# Patient Record
Sex: FEMALE | Race: WHITE | NOT HISPANIC OR LATINO
[De-identification: names, ages, dates, MRNs, and addresses within clinical notes are randomized per-mention and may not be internally consistent; named-entity substitution may affect disease eponyms.]

---

## 2019-03-18 ENCOUNTER — APPOINTMENT (OUTPATIENT)
Dept: ORTHOPEDIC SURGERY | Facility: CLINIC | Age: 64
End: 2019-03-18
Payer: MEDICARE

## 2019-03-18 VITALS
HEART RATE: 69 BPM | BODY MASS INDEX: 19.74 KG/M2 | OXYGEN SATURATION: 99 % | WEIGHT: 110 LBS | SYSTOLIC BLOOD PRESSURE: 147 MMHG | DIASTOLIC BLOOD PRESSURE: 87 MMHG | HEIGHT: 62.5 IN

## 2019-03-18 DIAGNOSIS — Z78.9 OTHER SPECIFIED HEALTH STATUS: ICD-10-CM

## 2019-03-18 DIAGNOSIS — Z60.2 PROBLEMS RELATED TO LIVING ALONE: ICD-10-CM

## 2019-03-18 PROCEDURE — 73564 X-RAY EXAM KNEE 4 OR MORE: CPT | Mod: LT

## 2019-03-18 PROCEDURE — 99203 OFFICE O/P NEW LOW 30 MIN: CPT

## 2019-03-18 SDOH — SOCIAL STABILITY - SOCIAL INSECURITY: PROBLEMS RELATED TO LIVING ALONE: Z60.2

## 2019-03-18 NOTE — HISTORY OF PRESENT ILLNESS
[de-identified] : Ms. Villegas is a 64-year-old woman who comes in with pain in her left knee. Pain started on March 9, 2019. She started to have pain when she was running and then later went to a wedding and was dancing and there was a crack in the knee and she had severe pain. She had a vasovagal reaction but not complete syncope. She could not bend her knee at all initially after the event. Gradually over the past 9 days her knee has started to bend more and pain is subsiding. Pain is about a 4-6/10 and is dull and occasionally sharp. It's now more intermittent aggravated by bending, walking and twisting. It's better with rest, heat and ice. She hasn't had any treatment for this.\par Normally she runs every day. She usually goes about 6 miles which is about a half walking and have running. In the past she has done marathons. She avoids running on roads because of chronic back pain.\par The pain in her knee with anterior and posterior. No prior knee issues.\par She is allergic to NSAIDs.

## 2019-03-18 NOTE — PHYSICAL EXAM
[LE] : 5/5 motor strength in bilateral lower extremities [DP] : dorsalis pedis 2+ and symmetric bilaterally [Normal RLE] : Right Lower Extremity: No scars, rashes, lesions, ulcers, skin intact [PT] : posterior tibial 2+ and symmetric bilaterally [Normal LLE] : Left Lower Extremity: No scars, rashes, lesions, ulcers, skin intact [Normal Touch] : sensation intact for touch [Normal] : Oriented to person, place, and time, insight and judgement were intact and the affect was normal [de-identified] : Bilateral knees:\par Nonantalgic gait. Intermittent pain when walking that makes her limp occasionally \par No effusion. No palpable palp popliteal cyst\par - erythema, edema, warmth. Skin is intact\par Tender LEFT knee medial joint line much more significant on the LEFT and RIGHT. Mild patellar tenderness LEFT.\par ROM: 0 degrees extension to 135 degrees flexion LEFT knee and 140° on the RIGHT knee with pain on full flexion LEFT knee. No  crepitus\par  LEFT knee + Jeffrey.\par 1A Lachman.  - Pivot shift. - posterior drawer. Normal rotational, varus/valgus laxity.\par Intact extensor mechanism.\par NVI distally.\par  [de-identified] : no respiratory distress [de-identified] : \par x-rays of the LEFT knee weightbearing 4 views today show possibly mild medial joint space narrowing but no osteophytes, sclerosis or subchondral cysts.

## 2019-03-18 NOTE — ASSESSMENT
[FreeTextEntry1] : 64-year-old woman with LEFT knee pain medially that started about 10 days ago. I suspect that she may have a degenerative type medial meniscus tear that did tear somewhat acutely causing her symptoms. She does not have any significant osteoarthritis.\par It is improving. I would recommend trying some physical therapy and we've reviewed home exercises for her to do. Right now she shouldn't do long walking or any running. She can ride a stationary bike. I don't think she needs a cane unless it's painful and then she can use the cane if needed. Unfortunately she cannot take NSAIDs. I offered her a corticosteroid injection as an option but we will wait and see if he gets better without it.\par Followup in 5-6 weeks if her knee is not better. If it's not getting better I will have her get an MRI scan.If her knee locks up or becomes acutely painful again then she may want to come in sooner

## 2019-04-24 ENCOUNTER — APPOINTMENT (OUTPATIENT)
Dept: ORTHOPEDIC SURGERY | Facility: CLINIC | Age: 64
End: 2019-04-24
Payer: MEDICARE

## 2019-04-24 DIAGNOSIS — S83.242A OTHER TEAR OF MEDIAL MENISCUS, CURRENT INJURY, LEFT KNEE, INITIAL ENCOUNTER: ICD-10-CM

## 2019-04-24 PROCEDURE — 99214 OFFICE O/P EST MOD 30 MIN: CPT

## 2019-04-24 NOTE — HISTORY OF PRESENT ILLNESS
[de-identified] : 63 yo with ongoing left knee pain.Her knee is feeling somewhat better. She uses a cane temporarily but then stopped using it because she didn't really need it.\par She didn't go to PT but did home exercises \par SHe took a little Tylenol but no NSAIDs  b/c she is allergic.\par She used the [place] for awhile.\par He does feel intermittent pain, stiffness in the knee is still not good enough that she would go running.

## 2019-04-24 NOTE — ASSESSMENT
[FreeTextEntry1] : 65 yo With improved but ongoing LEFT knee pain. I still suspect that she has a degenerative medial meniscus tear. The there is no significant arthritis but likely there is some chondromalacia.\par She is going to do formal physical therapy. She unfortunately cannot take NSAIDs. She can take Tylenol and ice and heat as needed. I offered her corticosteroid injection but she would prefer to wait and see.\par I think the pain persists then we should consider an injection.\par She was also given a prescription for an MRI given her ongoing pain and to rule out avascular note process or insufficiency fracture and more likely confirm the diagnosis of a medial meniscus tear and some mild arthritis or chondromalacia.\par I will call her with any MRI results that are done and she should otherwise followup after trying the therapy.Otherwise followup in about 6 weeks

## 2019-04-24 NOTE — PHYSICAL EXAM
[Normal] : Gait: normal [PT] : posterior tibial 2+ and symmetric bilaterally [LE] : Sensory: Intact in bilateral lower extremities [DP] : dorsalis pedis 2+ and symmetric bilaterally [Normal LLE] : Left Lower Extremity: No scars, rashes, lesions, ulcers, skin intact [Normal RLE] : Right Lower Extremity: No scars, rashes, lesions, ulcers, skin intact [Normal Touch] : sensation intact for touch [de-identified] : Knees:\par Nonantalgic gait.\par There may be a very slight LEFT knee effusion.\par - erythema, edema, warmth.\par Tender all along the LEFT medial joint line.\par Mildly tender pes tendons bilaterally.\par ROM: 0 degrees extension to 135-140 degrees flexion bilaterally with mild discomfort LEFT knee on full flexion. No crepitus\par Mild pain LEFT knee with Jeffrey.\par 1A Lachman.  - Pivot shift. - posterior drawer. Normal rotational, varus/valgus laxity.\par Intact extensor mechanism.\par NVI distally.\par

## 2019-10-02 PROBLEM — Z60.2 PERSON LIVING ALONE: Status: ACTIVE | Noted: 2019-03-18

## 2022-08-26 ENCOUNTER — APPOINTMENT (OUTPATIENT)
Dept: VASCULAR SURGERY | Facility: CLINIC | Age: 67
End: 2022-08-26

## 2022-08-26 ENCOUNTER — EMERGENCY (EMERGENCY)
Facility: HOSPITAL | Age: 67
LOS: 1 days | Discharge: HOME | End: 2022-08-26
Attending: EMERGENCY MEDICINE | Admitting: EMERGENCY MEDICINE
Payer: MEDICARE

## 2022-08-26 VITALS
RESPIRATION RATE: 16 BRPM | HEART RATE: 62 BPM | DIASTOLIC BLOOD PRESSURE: 91 MMHG | WEIGHT: 113.1 LBS | HEIGHT: 61 IN | OXYGEN SATURATION: 100 % | SYSTOLIC BLOOD PRESSURE: 172 MMHG | TEMPERATURE: 98 F

## 2022-08-26 VITALS
SYSTOLIC BLOOD PRESSURE: 126 MMHG | DIASTOLIC BLOOD PRESSURE: 85 MMHG | HEART RATE: 73 BPM | WEIGHT: 113 LBS | HEIGHT: 61 IN | BODY MASS INDEX: 21.34 KG/M2

## 2022-08-26 VITALS
OXYGEN SATURATION: 98 % | SYSTOLIC BLOOD PRESSURE: 165 MMHG | RESPIRATION RATE: 16 BRPM | HEART RATE: 71 BPM | DIASTOLIC BLOOD PRESSURE: 87 MMHG | TEMPERATURE: 98 F

## 2022-08-26 LAB
ALBUMIN SERPL ELPH-MCNC: 4.5 G/DL — SIGNIFICANT CHANGE UP (ref 3.3–5)
ALP SERPL-CCNC: 80 U/L — SIGNIFICANT CHANGE UP (ref 40–120)
ALT FLD-CCNC: 24 U/L — SIGNIFICANT CHANGE UP (ref 10–45)
ANION GAP SERPL CALC-SCNC: 10 MMOL/L — SIGNIFICANT CHANGE UP (ref 5–17)
APTT BLD: 28.2 SEC — SIGNIFICANT CHANGE UP (ref 27.5–35.5)
AST SERPL-CCNC: 25 U/L — SIGNIFICANT CHANGE UP (ref 10–40)
BASOPHILS # BLD AUTO: 0.04 K/UL — SIGNIFICANT CHANGE UP (ref 0–0.2)
BASOPHILS NFR BLD AUTO: 0.8 % — SIGNIFICANT CHANGE UP (ref 0–2)
BILIRUB SERPL-MCNC: 0.4 MG/DL — SIGNIFICANT CHANGE UP (ref 0.2–1.2)
BUN SERPL-MCNC: 15 MG/DL — SIGNIFICANT CHANGE UP (ref 7–23)
CALCIUM SERPL-MCNC: 9.7 MG/DL — SIGNIFICANT CHANGE UP (ref 8.4–10.5)
CHLORIDE SERPL-SCNC: 102 MMOL/L — SIGNIFICANT CHANGE UP (ref 96–108)
CK MB CFR SERPL CALC: 8.2 NG/ML — HIGH (ref 0–6.7)
CK SERPL-CCNC: 126 U/L — SIGNIFICANT CHANGE UP (ref 25–170)
CO2 SERPL-SCNC: 27 MMOL/L — SIGNIFICANT CHANGE UP (ref 22–31)
CREAT SERPL-MCNC: 0.58 MG/DL — SIGNIFICANT CHANGE UP (ref 0.5–1.3)
EGFR: 99 ML/MIN/1.73M2 — SIGNIFICANT CHANGE UP
EOSINOPHIL # BLD AUTO: 0.19 K/UL — SIGNIFICANT CHANGE UP (ref 0–0.5)
EOSINOPHIL NFR BLD AUTO: 3.6 % — SIGNIFICANT CHANGE UP (ref 0–6)
GLUCOSE SERPL-MCNC: 90 MG/DL — SIGNIFICANT CHANGE UP (ref 70–99)
HCT VFR BLD CALC: 36.7 % — SIGNIFICANT CHANGE UP (ref 34.5–45)
HGB BLD-MCNC: 12.5 G/DL — SIGNIFICANT CHANGE UP (ref 11.5–15.5)
IMM GRANULOCYTES NFR BLD AUTO: 0.2 % — SIGNIFICANT CHANGE UP (ref 0–1.5)
INR BLD: 0.96 — SIGNIFICANT CHANGE UP (ref 0.88–1.16)
LYMPHOCYTES # BLD AUTO: 2.63 K/UL — SIGNIFICANT CHANGE UP (ref 1–3.3)
LYMPHOCYTES # BLD AUTO: 49.9 % — HIGH (ref 13–44)
MCHC RBC-ENTMCNC: 31.4 PG — SIGNIFICANT CHANGE UP (ref 27–34)
MCHC RBC-ENTMCNC: 34.1 GM/DL — SIGNIFICANT CHANGE UP (ref 32–36)
MCV RBC AUTO: 92.2 FL — SIGNIFICANT CHANGE UP (ref 80–100)
MONOCYTES # BLD AUTO: 0.49 K/UL — SIGNIFICANT CHANGE UP (ref 0–0.9)
MONOCYTES NFR BLD AUTO: 9.3 % — SIGNIFICANT CHANGE UP (ref 2–14)
NEUTROPHILS # BLD AUTO: 1.91 K/UL — SIGNIFICANT CHANGE UP (ref 1.8–7.4)
NEUTROPHILS NFR BLD AUTO: 36.2 % — LOW (ref 43–77)
NRBC # BLD: 0 /100 WBCS — SIGNIFICANT CHANGE UP (ref 0–0)
NT-PROBNP SERPL-SCNC: 149 PG/ML — SIGNIFICANT CHANGE UP (ref 0–300)
PLATELET # BLD AUTO: 222 K/UL — SIGNIFICANT CHANGE UP (ref 150–400)
POTASSIUM SERPL-MCNC: 3.9 MMOL/L — SIGNIFICANT CHANGE UP (ref 3.5–5.3)
POTASSIUM SERPL-SCNC: 3.9 MMOL/L — SIGNIFICANT CHANGE UP (ref 3.5–5.3)
PROT SERPL-MCNC: 7.6 G/DL — SIGNIFICANT CHANGE UP (ref 6–8.3)
PROTHROM AB SERPL-ACNC: 11.4 SEC — SIGNIFICANT CHANGE UP (ref 10.5–13.4)
RBC # BLD: 3.98 M/UL — SIGNIFICANT CHANGE UP (ref 3.8–5.2)
RBC # FLD: 12.6 % — SIGNIFICANT CHANGE UP (ref 10.3–14.5)
SARS-COV-2 RNA SPEC QL NAA+PROBE: NEGATIVE — SIGNIFICANT CHANGE UP
SODIUM SERPL-SCNC: 139 MMOL/L — SIGNIFICANT CHANGE UP (ref 135–145)
TROPONIN T SERPL-MCNC: 0.01 NG/ML — SIGNIFICANT CHANGE UP (ref 0–0.01)
WBC # BLD: 5.27 K/UL — SIGNIFICANT CHANGE UP (ref 3.8–10.5)
WBC # FLD AUTO: 5.27 K/UL — SIGNIFICANT CHANGE UP (ref 3.8–10.5)

## 2022-08-26 PROCEDURE — 84484 ASSAY OF TROPONIN QUANT: CPT

## 2022-08-26 PROCEDURE — 85025 COMPLETE CBC W/AUTO DIFF WBC: CPT

## 2022-08-26 PROCEDURE — 99285 EMERGENCY DEPT VISIT HI MDM: CPT

## 2022-08-26 PROCEDURE — 93005 ELECTROCARDIOGRAM TRACING: CPT

## 2022-08-26 PROCEDURE — 71045 X-RAY EXAM CHEST 1 VIEW: CPT | Mod: 26

## 2022-08-26 PROCEDURE — 82553 CREATINE MB FRACTION: CPT

## 2022-08-26 PROCEDURE — 87635 SARS-COV-2 COVID-19 AMP PRB: CPT

## 2022-08-26 PROCEDURE — 85610 PROTHROMBIN TIME: CPT

## 2022-08-26 PROCEDURE — 93010 ELECTROCARDIOGRAM REPORT: CPT

## 2022-08-26 PROCEDURE — 83880 ASSAY OF NATRIURETIC PEPTIDE: CPT

## 2022-08-26 PROCEDURE — 99204 OFFICE O/P NEW MOD 45 MIN: CPT

## 2022-08-26 PROCEDURE — 85730 THROMBOPLASTIN TIME PARTIAL: CPT

## 2022-08-26 PROCEDURE — 71045 X-RAY EXAM CHEST 1 VIEW: CPT

## 2022-08-26 PROCEDURE — 80053 COMPREHEN METABOLIC PANEL: CPT

## 2022-08-26 PROCEDURE — 93971 EXTREMITY STUDY: CPT

## 2022-08-26 PROCEDURE — 36415 COLL VENOUS BLD VENIPUNCTURE: CPT

## 2022-08-26 PROCEDURE — 99285 EMERGENCY DEPT VISIT HI MDM: CPT | Mod: 25

## 2022-08-26 PROCEDURE — 82550 ASSAY OF CK (CPK): CPT

## 2022-08-26 NOTE — ED PROVIDER NOTE - PHYSICAL EXAMINATION
GEN: Well appearing, well developed, awake, alert, oriented to person, place, time/situation and in no apparent distress. NTAF  ENT: Airway patent, Nasal mucosa clear. Mouth with normal mucosa.  EYES: Clear bilaterally. PERRL, EOMI  RESPIRATORY: Breathing comfortably with normal RR. No W/C/R, no hypoxia or resp distress.  CARDIAC: Regular rate and rhythm, no M/R/G  ABDOMEN: Soft, nontender, +bowel sounds, no rebound, rigidity, or guarding.  MSK: Range of motion is not limited, no deformities noted. Bluish discoloration to R 3rd finger, sensation intact distally, cap reill 2 sec. +2 radial pulses b/l. Please refer to vascular note for detailed exam.   NEURO: Alert and oriented, no focal deficits.  SKIN: Skin normal color for race, warm, dry and intact. No evidence of rash.  PSYCH: Alert and oriented to person, place, time/situation. normal mood and affect. no apparent risk to self or others.

## 2022-08-26 NOTE — CONSULT NOTE ADULT - SUBJECTIVE AND OBJECTIVE BOX
67F PMH s/p MOH surgery and nasal reconstruction presented with right 3rd finger discoloration x 2 days. Patient reports that she was feeling sick earlier in the week, reported fatigue, fevers, sweats and chills that lasted one day and subsequently resolved. She reports that the week prior she had a rapid COVID test that was positive and had treatement with paxlovid  subsequent PCRs were all negative. 2 days ago she reports new onset finger discoloration of the index and middle finger without pain or parasthesia. She went to her outpatient vascular physician Dr. Diggs and was asked to come to the ED for evaluation. At baseline she is very active runs/walks 9 miles daily. Denies cp/sob, n/v, dizziness, syncope, f/c, n/t/w.    All: none  Med: none  PSH: Nose reconstruction  FH: None  SH: Glass of wine every night, denies smoking cigarettes.      PREVIOUS DIAGNOSTIC TESTING:      ECHO  FINDINGS: None    STRESS  FINDINGS: None    CATHETERIZATION  FINDINGS: None    Vital Signs Last 24 Hrs  T(C): 36.8 (26 Aug 2022 14:25), Max: 36.8 (26 Aug 2022 14:25)  T(F): 98.3 (26 Aug 2022 14:25), Max: 98.3 (26 Aug 2022 14:25)  HR: 62 (26 Aug 2022 14:25) (62 - 62)  BP: 172/91 (26 Aug 2022 14:25) (172/91 - 172/91)  BP(mean): --  RR: 16 (26 Aug 2022 14:25) (16 - 16)  SpO2: 100% (26 Aug 2022 14:25) (100% - 100%)    Parameters below as of 26 Aug 2022 14:25  Patient On (Oxygen Delivery Method): room air        PHYSICAL EXAM:    GENERAL: NAD, speaks in full sentences, no signs of respiratory distress  HEAD:  Atraumatic, Normocephalic  EYES: EOMI, PERRLA, conjunctiva and sclera clear  NECK: Supple, No JVD  CHEST/LUNG: Clear to auscultation bilaterally; No wheeze; No crackles; No accessory muscles used  HEART: Regular rate and rhythm; No murmurs;   ABDOMEN: Soft, Nontender, Nondistended; Bowel sounds present; No guarding  EXTREMITIES:  2+ Peripheral Pulses, small discoloration on the medial aspect of the R middle finger.  PSYCH: AAOx3  NEUROLOGY: non-focal  SKIN: No rashes or lesions      INTERPRETATION OF TELEMETRY:    ECG: Sinus bradycardia    I&O's Detail      LABS:                        12.5   5.27  )-----------( 222      ( 26 Aug 2022 17:08 )             36.7     08-26    139  |  102  |  15  ----------------------------<  90  3.9   |  27  |  0.58    Ca    9.7      26 Aug 2022 17:08    TPro  7.6  /  Alb  4.5  /  TBili  0.4  /  DBili  x   /  AST  25  /  ALT  24  /  AlkPhos  80  08-26    CARDIAC MARKERS ( 26 Aug 2022 17:08 )  x     / 0.01 ng/mL / 126 U/L / x     / 8.2 ng/mL      PT/INR - ( 26 Aug 2022 17:08 )   PT: 11.4 sec;   INR: 0.96          PTT - ( 26 Aug 2022 17:08 )  PTT:28.2 sec    I&O's Summary    BNPSerum Pro-Brain Natriuretic Peptide: 149 pg/mL (08-26 @ 17:08)    RADIOLOGY & ADDITIONAL STUDIES:
ID: This is a 67-yo female without major preceding PMH presenting with left leg cramping and right finger blue coloration for 1 day for whom vascular surgery was consulted for possible atheroembolic phenomenon.     HPI:   - Main symptom: right hand blueness and left leg cramping x 1 day  - She notes she developed left leg cramping sensation from hip downwards last night for which she became concerned. She also noted a bluish discoloration of the medial aspect of her right third finger. She called her brother (physician) who recommended cardiology evaluation. She spoke to Dr. Hernández who recommended vascular surgical evaluation and was seen in our office earlier today where outpatient work-up with CT-Angiogram Chest/Abdomen/Pelvis was recommended for somewhat faint distal pulses but in communication with Dr. Hernández she was sent to the ED for expedited work-up out of concern for atheroembolic phenomena.  - Of note, she believes the above symptoms prompting presentation are related to somewhat ill feelings the last 2 weeks when she was diagnosed with COVID-19 and treated with oral antivirals. She noted a minor nosebleed she thinks related to having taken 7 nasal swab C19 Ag tests. She also had a gastrointestinal illness with nausea/vomiting/diarrhea earlier in the week that resolved within 48 hours.  - Denies any/all prior history of leg pain with exertion or pain in her hands/arms with use.   - She runs regularly without issues but occasionally has related musculoskeletal pains from exercise. She has chronic lower back pain that is not worse from baseline.  - Prior work-up: labs without leukocytosis or other remarkable findings.  - PMH: no major medical problems  - PSH: surgery for "twisted intestines around the cecum that was initially suspected to be appendicitis" many years ago  - SocHx: Denies tobacco abuse, alcohol abuse but does drink wine somewhat regularly. She denies any/all illicit drug use including cocaine and methamphetamines.    General: no F/C  Neuro: No seizures, focal neurologic symptoms  Psych: No mood changes  CV: No CP, SOB  Pulm: No SOB, coughing  GI: No N/V, abodminal pain. No diarrhea.   : No dysuria  Heme: No weakness, easy bruising.  Skin: No rashes excepting blue color of right hand  Lymph: No swelling    MEDICATIONS  (STANDING):    MEDICATIONS  (PRN):      Allergies    ibuprofen (Hives)    Intolerances        SOCIAL HISTORY:    FAMILY HISTORY:      Vital Signs Last 24 Hrs  T(C): 36.8 (26 Aug 2022 14:25), Max: 36.8 (26 Aug 2022 14:25)  T(F): 98.3 (26 Aug 2022 14:25), Max: 98.3 (26 Aug 2022 14:25)  HR: 62 (26 Aug 2022 14:25) (62 - 62)  BP: 172/91 (26 Aug 2022 14:25) (172/91 - 172/91)  BP(mean): --  RR: 16 (26 Aug 2022 14:25) (16 - 16)  SpO2: 100% (26 Aug 2022 14:25) (100% - 100%)    Parameters below as of 26 Aug 2022 14:25  Patient On (Oxygen Delivery Method): room air          Physical Examination    Gen: Well-appearing 67-yo female in NAD  Neurologic: AAOx3  Cardiac: Normal rate, regular rhythm  Vascular: Palpable pulses in PT / DP bilaterally and radial/ulnar positions bilaterally.   Pulm: Breathing comfortably  Abd: Soft, non-distended; No TTP throughout.   Incision: Well approximated without erythema/edema/induration.   : No Gil  Skin: Right third finger on medial aspect of middle phalanx with middle bluish discoloration that blanches and is non-TTP. No other observed skin changes /mottling.  Extremities: No edema.  Psychiatric: Interacting normally    LABS:                        12.5   5.27  )-----------( 222      ( 26 Aug 2022 17:08 )             36.7     08-26    139  |  102  |  15  ----------------------------<  90  3.9   |  27  |  0.58    Ca    9.7      26 Aug 2022 17:08    TPro  7.6  /  Alb  4.5  /  TBili  0.4  /  DBili  x   /  AST  25  /  ALT  24  /  AlkPhos  80  08-26    PT/INR - ( 26 Aug 2022 17:08 )   PT: 11.4 sec;   INR: 0.96          PTT - ( 26 Aug 2022 17:08 )  PTT:28.2 sec

## 2022-08-26 NOTE — ED ADULT NURSE NOTE - NSIMPLEMENTINTERV_GEN_ALL_ED
Implemented All Universal Safety Interventions:  [place] to call system. Call bell, personal items and telephone within reach. Instruct patient to call for assistance. Room bathroom lighting operational. Non-slip footwear when patient is off stretcher. Physically safe environment: no spills, clutter or unnecessary equipment. Stretcher in lowest position, wheels locked, appropriate side rails in place.

## 2022-08-26 NOTE — ED PROVIDER NOTE - PATIENT PORTAL LINK FT
You can access the FollowMyHealth Patient Portal offered by [facility] by registering at the following website: [email or web address]. By joining [facility]’s FollowMyHealth portal, you will also be able to view your health information using other applications (apps) compatible with our system.

## 2022-08-26 NOTE — CONSULT NOTE ADULT - ASSESSMENT
This is a 67-yo female without major preceding PMH presenting with left leg cramping and right finger blue coloration for 1 day for whom vascular surgery was consulted for possible atheroembolic phenomenon.     Thinking: Minimally symptomatic and this therefore represents improbable presentation of atheroembolic disease after only known risk factor two weeks ago (COVID-19 infection). She has excellent distal perfusion as demonstrated by pulse examination and clinical history. Generally, the symptoms she notes over the past two weeks are not acutely concerning for any one process and more likely simple benign explanations are operative. In the case of the finger, this may well be a simple contusion suffered from minor, unrecalled trauma. The leg cramping is non-persistent, non-exertional, and without evidence of new peripheral edema. From vascular surgical perspective, outpatient work-up with CT-A Chest/Abdomen/Pelvis to exclude source for atheroembolic phenomena is not unreasonable, but we'll defer to cardiology for timing of work-up.     - No acute vascular surgical intervention of probable benefit to Ms. Villegas.   - Will follow-up results of CT-Angiogram Chest/Abdomen/Pelvis.   - F/U cardiology recommendations (Dr. Hernández).

## 2022-08-26 NOTE — ED PROVIDER NOTE - OBJECTIVE STATEMENT
67F with a h/o nose surgery in the remote past, who p/w right 3rd finger discoloration x 1 day- states her finger became numb and colr and then she noticed a bluish discoloration. She was referred to vascular, Dr. Diggs and saw resident David, and then saw Dr. Hernández from cards and was reportedly sent to ER for a CT scan. She states 2.5 week ago she had asymptomatic covid + on rapid test (which she gets frequently), she took half a course of paxlovid until her PCR test became negative. She had multiple repeat/confirmatory tests which led to a nose bleed from right nostril 2 weeks ago which resolved. She then has "food poisoning" (n/v,cramps, LH) earlier this week followed by severe legs cramps which has also resolved. She runs/walks [place] daily but could not do so because she was week. She currently denies cp/sob, n/v, dizziness, syncope, f/c, n/t/w in ext. 67F with a h/o nose surgery in the remote past, who p/w right 3rd finger discoloration x 1 day- states her finger became numb and colr and then she noticed a bluish discoloration. She was referred to vascular, Dr. Diggs and saw resident David, and then saw Dr. Hernández from cards and was reportedly sent to ER for a CT scan. She states 2.5 week ago she had asymptomatic covid + on rapid test (which she gets frequently), she took half a course of paxlovid until her PCR test became negative. She had multiple repeat/confirmatory tests which led to a nose bleed from right nostril 2 weeks ago which resolved. She then has "food poisoning" (n/v/cramps, LH) earlier this week followed by severe legs cramps which has also resolved. She runs/walks [place] daily but could not do so because she was week. She currently denies cp/sob, n/v, dizziness, syncope, f/c, n/t/w in ext.

## 2022-08-26 NOTE — ED PROVIDER NOTE - PROGRESS NOTE DETAILS
Per vascular no indication for emergent workup in the ED, may follow up and get workup as an outpt.   Dr. Finkelstein was paged and did not call back, however pt cards resident stopped by and saw the patient and is in agreement with plan for DC home with outpt follow up. pt is eager for DC.   ED evaluation and management discussed with the patient in detail.  Close PMD follow up encouraged.  Strict ED return instructions discussed in detail and patient given the opportunity to ask any questions about their discharge diagnosis and instructions. Patient verbalized understanding.

## 2022-08-26 NOTE — ED PROVIDER NOTE - CLINICAL SUMMARY MEDICAL DECISION MAKING FREE TEXT BOX
67F who p/w right 3rd finger discoloration x 1 day, sent in by vascular and cards to the ED for further eval. 67F who p/w right 3rd finger discoloration x 1 day, sent in by vascular and cards to the ED for further eval.  Pt is well-appearing on exam, VSS, no focal neuro deficits, EKG NSR, no ischemia, pulses present distally. Bruise noted to finger, sensation and cap refill intact.   Plan for labs and will page vascular and Dr. Finkelstein for clarification of reason for ER visit as patient is unsure and was not provided with a referral note.

## 2022-08-26 NOTE — CONSULT NOTE ADULT - ASSESSMENT
67F PMH s/p MOH surgery and nasal reconstruction presented with right 3rd finger discoloration x 2 days. Cardiology consulted.     #Finger Discoloration  Recently COVID+ s/p plaxovid with subsequent negative PCR tests. Recent febrile illness and episodes of calf cramping at rest. At baseline very active walks/runs 11 miles a day with out chest pain or claudication. Physical exam: no audible murmurs, palpable radial,  PT and DP pulses with small area of discoloration on the medial aspect of the R middle finger. Low concern for embolic phenomenon.   - ECG: Sinus bradycardia  - Follow up with vascular surgery as outpatient  - No further cardiac intervention.      Case discussed with Dr Gardner and Dr Finkelstein, cardiology will sign off, please re-consult with any further questions. 67F PMH s/p MOH surgery and nasal reconstruction presented with right 3rd finger discoloration x 2 days. Cardiology consulted.     #Finger Discoloration  Recently COVID+ s/p plaxovid with subsequent negative PCR tests. Recent febrile illness and episodes of calf cramping at rest. At baseline very active walks/runs 11 miles a day with out chest pain or claudication. Physical exam: no audible murmurs, palpable radial,  PT and DP pulses with small area of discoloration on the medial aspect of the R middle finger. Low concern for embolic phenomenon, possible reaction to monoclonal Ab.  - ECG: Sinus bradycardia  - Follow up with vascular   - Follow up with Cardiology - Dr Finkelstein [phone]  - No further cardiac intervention.      Case discussed with Dr Gardner and Dr Finkelstein, cardiology will sign off, please re-consult with any further questions.

## 2022-08-26 NOTE — ED ADULT NURSE NOTE - OBJECTIVE STATEMENT
67y F, presents to the ED c/o bruising and numbness to left hand 3rd digit. As per triage, pt states "yesterday I was at work when my left 3rd finger turned blue." bruising noted to L 3rd digit. Sent in by cardiologist today for for concern for "clot in her heart." Denies chest pain, palpitations, fever, chills, 67y F, presents to the ED c/o bruising and numbness to left hand 3rd digit. As per triage, pt states "yesterday I was at work when my left 3rd finger turned blue." bruising noted to L 3rd digit. Sent in by cardiologist today for concern for "clot in her heart." Denies chest pain, palpitations, fever, chills. Pt A&Ox4, ambulatory with steady gait, speaking in clear/full sentences, vital signs stable.

## 2022-08-26 NOTE — ED ADULT TRIAGE NOTE - CHIEF COMPLAINT QUOTE
PT states "yesterday I was at work when my left 3rd finger turned blue." bruising noted to L 3rd digit. went to a cardiologist today who sent her in for concern for "clot in her heart." denies CP, SOB.

## 2022-08-26 NOTE — ED PROVIDER NOTE - NSFOLLOWUPINSTRUCTIONS_ED_ALL_ED_FT
Please follow up with your primary care physician and Dr. Finkelstein to arrange for further workup as needed.  Return to the Emergency Department if you have any new or worsening symptoms, or for any other concerns. Please read below for further information.

## 2022-08-30 DIAGNOSIS — L81.9 DISORDER OF PIGMENTATION, UNSPECIFIED: ICD-10-CM

## 2022-08-30 DIAGNOSIS — R00.1 BRADYCARDIA, UNSPECIFIED: ICD-10-CM

## 2022-08-30 DIAGNOSIS — G89.29 OTHER CHRONIC PAIN: ICD-10-CM

## 2022-08-30 DIAGNOSIS — Z88.6 ALLERGY STATUS TO ANALGESIC AGENT: ICD-10-CM

## 2022-08-30 DIAGNOSIS — S60.031A CONTUSION OF RIGHT MIDDLE FINGER WITHOUT DAMAGE TO NAIL, INITIAL ENCOUNTER: ICD-10-CM

## 2022-08-30 DIAGNOSIS — M54.50 LOW BACK PAIN, UNSPECIFIED: ICD-10-CM

## 2022-08-30 DIAGNOSIS — R20.0 ANESTHESIA OF SKIN: ICD-10-CM

## 2022-08-30 DIAGNOSIS — Z20.822 CONTACT WITH AND (SUSPECTED) EXPOSURE TO COVID-19: ICD-10-CM

## 2022-08-30 DIAGNOSIS — M79.605 PAIN IN LEFT LEG: ICD-10-CM

## 2022-08-30 DIAGNOSIS — Z86.16 PERSONAL HISTORY OF COVID-19: ICD-10-CM
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
## 2022-09-03 PROBLEM — Z86.79 HISTORY OF CEREBRAL EMBOLISM: Status: RESOLVED | Noted: 2022-08-26 | Resolved: 2022-09-03

## 2022-09-03 PROBLEM — M94.262 CHONDROMALACIA OF LEFT KNEE: Status: RESOLVED | Noted: 2019-04-24 | Resolved: 2022-09-03

## 2022-09-03 PROBLEM — M25.562 LEFT KNEE PAIN: Status: RESOLVED | Noted: 2019-03-18 | Resolved: 2022-09-03

## 2022-09-07 ENCOUNTER — TRANSCRIPTION ENCOUNTER (OUTPATIENT)
Age: 67
End: 2022-09-07

## 2022-09-07 ENCOUNTER — APPOINTMENT (OUTPATIENT)
Dept: HEART AND VASCULAR | Facility: CLINIC | Age: 67
End: 2022-09-07

## 2022-09-07 VITALS — SYSTOLIC BLOOD PRESSURE: 150 MMHG | DIASTOLIC BLOOD PRESSURE: 90 MMHG

## 2022-09-07 VITALS
OXYGEN SATURATION: 98 % | DIASTOLIC BLOOD PRESSURE: 90 MMHG | HEIGHT: 61 IN | WEIGHT: 112 LBS | TEMPERATURE: 97.8 F | SYSTOLIC BLOOD PRESSURE: 150 MMHG | HEART RATE: 65 BPM | BODY MASS INDEX: 21.14 KG/M2

## 2022-09-07 DIAGNOSIS — M25.562 PAIN IN LEFT KNEE: ICD-10-CM

## 2022-09-07 DIAGNOSIS — M94.262 CHONDROMALACIA, LEFT KNEE: ICD-10-CM

## 2022-09-07 DIAGNOSIS — L81.9 DISORDER OF PIGMENTATION, UNSPECIFIED: ICD-10-CM

## 2022-09-07 DIAGNOSIS — C44.320 SQUAMOUS CELL CARCINOMA OF SKIN OF UNSPECIFIED PARTS OF FACE: ICD-10-CM

## 2022-09-07 DIAGNOSIS — Z86.79 PERSONAL HISTORY OF OTHER DISEASES OF THE CIRCULATORY SYSTEM: ICD-10-CM

## 2022-09-07 PROCEDURE — 93000 ELECTROCARDIOGRAM COMPLETE: CPT

## 2022-09-07 PROCEDURE — 99204 OFFICE O/P NEW MOD 45 MIN: CPT | Mod: 25

## 2022-09-07 NOTE — HISTORY OF PRESENT ILLNESS
[FreeTextEntry1] : Ms. Villegas presents for initial evaluation and management of Achenbach syndrome (finger hematomas) and HTN.  On 08/25/22, she had the onset of right middle finger discoloration.  In addition, she had complaints of acute left calf pain.  She called her brother-in-law (Elvis Leach MD) who is a urologist and he advised her to be evaluated by a vascular surgeon.  She was sent to Amy Cool MD the following day on 08/26/22 and was evaluated by his PA.  She had left lower extremity venous sonography performed which was normal.  Her bilateral DP pulses were not able to be palpated so the decision was made to send her to the [facility] ED for evaluation.  In the ED, she had a normal work up and was discharged home.  Of note, she had viral URI symptoms associated with nausea and emesis one week prior and, after the initial weakly positive test, tested negative multiple times for COVID-19.  At present, her symptoms have completely resolved.  She is under a fair amount of stress in dealing with the purchase of a new apartment.  \par \par \par \par

## 2022-09-07 NOTE — ASSESSMENT
[FreeTextEntry1] : 1. HTN: BP not at ACC/AHA 2017 guideline target:\par      - will send for echocardiogram to rule out hypertensive heart disease\par      - she will maintain an ambulatory BP log for my review next visit\par \par 2. 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 It is characterized by recurrent spontaneous subcutaneous bleeding in the fingers, typically on the palmar surface, mainly around the proximal interphalangeal joint creases. The cause is unknown, but local vascular fragility has been suggested. Although relapses may frequently occur, no treatment is indicated, as the symptoms resolve spontaneously within a few days. The diagnosis is based on the typical clinical presentation, as results of routine laboratory testing and Doppler studies of the arteries of the arm are usually normal.\par \par \par

## 2022-09-07 NOTE — REASON FOR VISIT
[Other: ____] : [unfilled] [FreeTextEntry1] : Diagnostic Tests:\par -----------------------------------\par ECG:\par 08/26/22: sinus bradycardia at 59 bpm, possible old septal MI. \par -----------------------------------\par Lower extremity veins:\par 08/26/22: sono: unilateral left: no DVT, no SVT.

## 2022-09-15 NOTE — PROCEDURE
[FreeTextEntry1] : Venous duplex performed to evaluate for LE thrombus reveals no acute DVT/SVT in the LLE

## 2022-09-15 NOTE — HISTORY OF PRESENT ILLNESS
[FreeTextEntry1] : 67yoF w/no significant PMHx but family h/o CVA (father), referred by her brother-in-law Dr. Leach (urology), for evaluation of a R middle finger that became discolored 1d prior, and was associated w/acute L calf pain.  Pt states that she was tested for COVID multiple times 1wk prior for malaise, fatigue, and was started prophylactically on Paxlovid.  She also reports nausea and vomiting for the past 3d, unsure if she developed a fever, but did feel feverish w/shaking chills.  Pt denies any smoking hx or family h/o aneurysmal disease.  She is worried as her pharmacist told her her finger may be a sign of early stroke, and Dr. Leach feels she should be evaluated by vascular surgery.  Pt exercises regularly and runs/walks daily.

## 2022-09-15 NOTE — ASSESSMENT
[FreeTextEntry1] : 67yoF w/no significant PMHx but family h/o CVA (father), referred by her brother-in-law Dr. Leach (urology), for evaluation of a R middle finger that became discolored 1d prior, and was associated w/acute L calf pain.  Pt states that she was tested for COVID multiple times 1wk prior for malaise, fatigue, and was started prophylactically on Paxlovid.  She also reports nausea and vomiting for the past 3d, unsure if she developed a fever, but did feel feverish w/shaking chills.  Pt denies any smoking hx or family h/o aneurysmal disease.  She is worried as her pharmacist told her her finger may be a sign of early stroke, and Dr. Leach feels she should be evaluated by vascular surgery.  Pt exercises regularly and runs/walks daily.\par \par All extremities well-perfused but R middle finger blue, nontender, and DP pulses difficult to palpate on exam.  Venous duplex performed to evaluate for LE thrombus reveals no acute DVT/SVT in the LLE.  No clear evidence of embolic disease to the extremities, but in light of recent illness and acute color change in the filter and non-palpable DP pulses b/l (odd finding in a healthy, active runner), would recommend CTA aorta w/runoff to feet to assess for aortic atheroembolic source, and cardiac evaluation w/echo to assess for valvular vegetations.  Discussed plan w/pt and Dr. Leach (family member), who contacted Dr. Finkelstein to evaluate pt for cardiac source of embolism.

## 2022-09-15 NOTE — PHYSICAL EXAM
[Normal Thyroid] : the thyroid was normal [Normal Breath Sounds] : Normal breath sounds [Respiratory Effort] : normal respiratory effort [Normal Heart Sounds] : normal heart sounds [Normal Rate and Rhythm] : normal rate and rhythm [2+] : left 2+ [0] : left 0 [Alert] : alert [Calm] : calm [JVD] : no jugular venous distention  [Carotid Bruits] : no carotid bruits [Right Carotid Bruit] : no bruit heard over the right carotid [Left Carotid Bruit] : no bruit heard over the left carotid [Ankle Swelling (On Exam)] : not present [Varicose Veins Of Lower Extremities] : not present [] : not present [Abdomen Masses] : No abdominal masses [Abdomen Tenderness] : ~T ~M No abdominal tenderness [de-identified] : Healthy appearance, NAD [de-identified] : NC/AT, anicteric [de-identified] : FROM throughout, strength 5/5x4, +tenderness in the L calf, no palpable cords in the LEs [de-identified] : Blue discoloration of the R middle finger, no livedo reticularis [de-identified] : Neurosensory grossly intact in all extremities

## 2022-09-15 NOTE — ADDENDUM
[FreeTextEntry1] : This note was written by David Deal, acting as a scribe for Dr. Amy Cool.  I, Dr. Amy Cool, have read and attest that all the information, medical decision-making, and discharge instructions within are true and accurate.\par \par I, Dr. Amy Cool, personally performed the evaluation and management (E/M) services for this new patient.  That E/M includes conducting the initial examination, assessing all conditions, and establishing the plan of care.  Today, my ACP, David Deal, was here to observe my evaluation and management services for this patient to be followed going forward.

## 2022-09-15 NOTE — REASON FOR VISIT
[Consultation] : a consultation visit [FreeTextEntry1] : Discoloration of the R middle finger, acute pain in L calf

## 2022-10-26 ENCOUNTER — APPOINTMENT (OUTPATIENT)
Dept: HEART AND VASCULAR | Facility: CLINIC | Age: 67
End: 2022-10-26

## 2022-10-26 VITALS
BODY MASS INDEX: 21.93 KG/M2 | OXYGEN SATURATION: 98 % | DIASTOLIC BLOOD PRESSURE: 82 MMHG | HEART RATE: 75 BPM | SYSTOLIC BLOOD PRESSURE: 142 MMHG | WEIGHT: 116.13 LBS | HEIGHT: 61 IN

## 2022-10-26 VITALS — SYSTOLIC BLOOD PRESSURE: 144 MMHG | DIASTOLIC BLOOD PRESSURE: 74 MMHG

## 2022-10-26 DIAGNOSIS — F41.9 ANXIETY DISORDER, UNSPECIFIED: ICD-10-CM

## 2022-10-26 PROCEDURE — 99214 OFFICE O/P EST MOD 30 MIN: CPT

## 2022-10-26 NOTE — ASSESSMENT
[FreeTextEntry1] : 1. 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 It is characterized by recurrent spontaneous subcutaneous bleeding in the fingers, typically on the palmar surface, mainly around the proximal interphalangeal joint creases. The cause is unknown, but local vascular fragility has been suggested. Although relapses may frequently occur, no treatment is indicated, as the symptoms resolve spontaneously within a few days. The diagnosis is based on the typical clinical presentation, as results of routine laboratory testing and Doppler studies of the arteries of the arm are usually normal.\par \par 3. Anxiety: significant symptoms, + insomnia:\par      - encouraged to see a mental health professional (given contact information for a clinical psychologist, Kaushal Ware ,PhD ([phone])

## 2022-10-26 NOTE — REVIEW OF SYSTEMS
[Anxiety] : anxiety [Negative] : Heme/Lymph [Dyspnea on exertion] : not dyspnea during exertion [FreeTextEntry2] : + insomnia

## 2022-10-26 NOTE — HISTORY OF PRESENT ILLNESS
[FreeTextEntry1] : Ms. Villegas presents for follow up and management of Achenbach syndrome (finger hematomas), anxiety disorder, and HTN.  On 08/25/22, she had the onset of right middle finger discoloration.  In addition, she had complaints of acute left calf pain.  She called her brother-in-law (Elvis Leach MD) who is a urologist and he advised her to be evaluated by a vascular surgeon.  She was sent to Amy Cool MD the following day on 08/26/22 and was evaluated by his PA.  She had left lower extremity venous sonography performed which was normal.  Her bilateral DP pulses were not able to be palpated so the decision was made to send her to the [facility] ED for evaluation.  In the ED, she had a normal work up and was discharged home.  Of note, she had viral URI symptoms associated with nausea and emesis one week prior and, after the initial weakly positive test, tested negative multiple times for COVID-19.   She is under a fair amount of stress in dealing with the purchase of a new apartment and a years long renovation process.  At present, he has complaints of right arm paraesthesias.  Of note, she has had years of untreated anxiety and we discussed the benefits of seeking out a mental health professional.  \par \par \par \par

## 2022-11-09 ENCOUNTER — FORM ENCOUNTER (OUTPATIENT)
Age: 67
End: 2022-11-09

## 2022-11-10 ENCOUNTER — OUTPATIENT (OUTPATIENT)
Dept: OUTPATIENT SERVICES | Facility: HOSPITAL | Age: 67
LOS: 1 days | End: 2022-11-10
Payer: MEDICARE

## 2022-11-10 DIAGNOSIS — I10 ESSENTIAL (PRIMARY) HYPERTENSION: ICD-10-CM

## 2022-11-10 PROCEDURE — 93306 TTE W/DOPPLER COMPLETE: CPT | Mod: 26

## 2022-11-10 PROCEDURE — 93306 TTE W/DOPPLER COMPLETE: CPT

## 2022-12-30 ENCOUNTER — APPOINTMENT (OUTPATIENT)
Dept: PLASTIC SURGERY | Facility: CLINIC | Age: 67
End: 2022-12-30
Payer: SELF-PAY

## 2022-12-30 PROCEDURE — 99499 UNLISTED E&M SERVICE: CPT

## 2023-01-11 ENCOUNTER — APPOINTMENT (OUTPATIENT)
Dept: HEART AND VASCULAR | Facility: CLINIC | Age: 68
End: 2023-01-11
Payer: MEDICARE

## 2023-01-11 VITALS
WEIGHT: 116 LBS | HEIGHT: 61 IN | HEART RATE: 89 BPM | SYSTOLIC BLOOD PRESSURE: 131 MMHG | DIASTOLIC BLOOD PRESSURE: 71 MMHG | BODY MASS INDEX: 21.9 KG/M2

## 2023-01-11 PROCEDURE — 99215 OFFICE O/P EST HI 40 MIN: CPT

## 2023-01-11 NOTE — ASSESSMENT
[FreeTextEntry1] : 1. 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 It is characterized by recurrent spontaneous subcutaneous bleeding in the fingers, typically on the palmar surface, mainly around the proximal interphalangeal joint creases. The cause is unknown, but local vascular fragility has been suggested. Although relapses may frequently occur, no treatment is indicated, as the symptoms resolve spontaneously within a few days. The diagnosis is based on the typical clinical presentation, as results of routine laboratory testing and Doppler studies of the arteries of the arm are usually normal.\par \par 3. Anxiety: significant symptoms, + insomnia:\par      - encouraged to see a mental health professional (given contact information for a clinical psychologist, Kaushal Wrae ,PhD ([phone])\par \par 4. Mechanical fall: no syncope: + head laceration and likely rib contusions:  s/p negative X-ray of the ribs: \par      - if her LIVINGSTON persists, will send for a CT chest\par      - if she develops visual disturbance, will send for a CT head\par      - check lab work today \par \par \par ***seen as an urgent add-on today ***\par \par \par

## 2023-01-11 NOTE — HISTORY OF PRESENT ILLNESS
[FreeTextEntry1] : Ms. Villegas presents for follow up and management of Achenbach syndrome (finger hematomas), anxiety disorder, and HTN.  On 08/25/22, she had the onset of right middle finger discoloration.  In addition, she had complaints of acute left calf pain.  She called her brother-in-law (Elvis Leach MD) who is a urologist and he advised her to be evaluated by a vascular surgeon.  She was sent to Amy Cool MD the following day on 08/26/22 and was evaluated by his PA.  She had left lower extremity venous sonography performed which was normal.  Her bilateral DP pulses were not able to be palpated so the decision was made to send her to the [facility] ED for evaluation.  In the ED, she had a normal work up and was discharged home.  Of note, she had viral URI symptoms associated with nausea and emesis one week prior and, after the initial weakly positive test, tested negative multiple times for COVID-19.   She is under a fair amount of stress in dealing with the purchase of a new apartment and a years long renovation process.   Of note, she has had years of untreated anxiety and we discussed the benefits of seeking out a mental health professional.  On 11/10/22, she had an echocardiogram which revealed an EF of 60% and was a normal study. On 12/30/22, she had a mechanical fall on the street (while returning from an orthopedic surgeon's office for low back pain/hip pain) and suffered a right head laceration and right rib cage bruising .  She did not want to go the emergency department at that time.  She went to [facility] on 01/08/23 and had an X-Ray of her ribs which ruled out rib fractures and pneumothorax.  She then went to a plastic surgeon who addressed her head laceration with surgical glue but did not send her for a CT head.  Lastly, she went to an ophthalmologist who did not note any acute visual disturbances.  At present, she has complaints of rib pain and mild LIVINGSTON. \par \par \par \par

## 2023-01-11 NOTE — REASON FOR VISIT
[FreeTextEntry1] : Diagnostic Tests:\par -----------------------------------\par ECG:\par 08/26/22: sinus bradycardia at 59 bpm, possible old septal MI. \par -----------------------------------\par Lower extremity veins:\par 08/26/22: sono: unilateral left: no DVT, no SVT. \par -----------------------------------\par Echo:\par 11/10/22: EF 60%, normal study.

## 2023-01-12 LAB
24R-OH-CALCIDIOL SERPL-MCNC: 41.1 PG/ML
ALBUMIN SERPL ELPH-MCNC: 4.7 G/DL
ALP BLD-CCNC: 100 U/L
ALT SERPL-CCNC: 25 U/L
ANION GAP SERPL CALC-SCNC: 12 MMOL/L
AST SERPL-CCNC: 23 U/L
BASOPHILS # BLD AUTO: 0.04 K/UL
BASOPHILS NFR BLD AUTO: 0.5 %
BILIRUB SERPL-MCNC: 0.8 MG/DL
BUN SERPL-MCNC: 17 MG/DL
CALCIUM SERPL-MCNC: 10.4 MG/DL
CHLORIDE SERPL-SCNC: 102 MMOL/L
CHOLEST SERPL-MCNC: 261 MG/DL
CO2 SERPL-SCNC: 26 MMOL/L
CREAT SERPL-MCNC: 0.64 MG/DL
EGFR: 96 ML/MIN/1.73M2
EOSINOPHIL # BLD AUTO: 0.12 K/UL
EOSINOPHIL NFR BLD AUTO: 1.6 %
ESTIMATED AVERAGE GLUCOSE: 97 MG/DL
GLUCOSE SERPL-MCNC: 112 MG/DL
HBA1C MFR BLD HPLC: 5 %
HCT VFR BLD CALC: 39.9 %
HDLC SERPL-MCNC: 109 MG/DL
HGB BLD-MCNC: 13.1 G/DL
IMM GRANULOCYTES NFR BLD AUTO: 0.3 %
LDLC SERPL DIRECT ASSAY-MCNC: 146 MG/DL
LYMPHOCYTES # BLD AUTO: 2.49 K/UL
LYMPHOCYTES NFR BLD AUTO: 33.2 %
MAN DIFF?: NORMAL
MCHC RBC-ENTMCNC: 31.4 PG
MCHC RBC-ENTMCNC: 32.8 GM/DL
MCV RBC AUTO: 95.7 FL
MONOCYTES # BLD AUTO: 0.48 K/UL
MONOCYTES NFR BLD AUTO: 6.4 %
NEUTROPHILS # BLD AUTO: 4.35 K/UL
NEUTROPHILS NFR BLD AUTO: 58 %
PLATELET # BLD AUTO: 352 K/UL
POTASSIUM SERPL-SCNC: 5.7 MMOL/L
PROT SERPL-MCNC: 7.4 G/DL
RBC # BLD: 4.17 M/UL
RBC # FLD: 13 %
SODIUM SERPL-SCNC: 140 MMOL/L
TRIGL SERPL-MCNC: 67 MG/DL
TSH SERPL-ACNC: 1.58 UIU/ML
WBC # FLD AUTO: 7.5 K/UL

## 2023-01-27 NOTE — HISTORY OF PRESENT ILLNESS
[FreeTextEntry1] : 68 yo fell and hit her head outside on the sidewalk earlier this morning presents this afternoon to evaluate right upper eye brow laceration and scarring. Reports No LOC, No n/v no double vision, no lethargy, no facial weakness or numbness, no tinnitus, no trismus. Does not report any additional injuries. referred by her brother - [facility] Urologist on [place]

## 2023-01-27 NOTE — ASSESSMENT
[FreeTextEntry1] : Abrasion and small partial thickness breaks in the skin treated with Dermabond. Reviewed scar management. Encouraged ice, elevation, moisturizer next week, shower regularly.

## 2023-01-27 NOTE — PHYSICAL EXAM
[de-identified] : PERRLA, EOMI b/l, CN II-XII intact, right lateral eyebrow region with abrasion and mutiplpe 1-2mm breaks in the epidermis, partial thickness, no full thickness laceration, no hematoma, no collections, no sign of infection. + echymosis. cleaned the area thoroughly with saline moistened gauze and Betadine and then applied Dermabond to the affected area.   [de-identified] : P

## 2023-03-10 ENCOUNTER — APPOINTMENT (OUTPATIENT)
Dept: INTERNAL MEDICINE | Facility: CLINIC | Age: 68
End: 2023-03-10
Payer: MEDICARE

## 2023-03-10 ENCOUNTER — NON-APPOINTMENT (OUTPATIENT)
Age: 68
End: 2023-03-10

## 2023-03-10 VITALS
HEART RATE: 61 BPM | DIASTOLIC BLOOD PRESSURE: 81 MMHG | OXYGEN SATURATION: 97 % | BODY MASS INDEX: 21.52 KG/M2 | HEIGHT: 61 IN | TEMPERATURE: 98.1 F | SYSTOLIC BLOOD PRESSURE: 135 MMHG | WEIGHT: 114 LBS
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
## 2023-03-10 DIAGNOSIS — Z41.1 ENCOUNTER FOR COSMETIC SURGERY: ICD-10-CM

## 2023-03-10 DIAGNOSIS — Z23 ENCOUNTER FOR IMMUNIZATION: ICD-10-CM

## 2023-03-10 DIAGNOSIS — M79.81 NONTRAUMATIC HEMATOMA OF SOFT TISSUE: ICD-10-CM

## 2023-03-10 PROCEDURE — G0439: CPT

## 2023-03-10 PROCEDURE — 36415 COLL VENOUS BLD VENIPUNCTURE: CPT

## 2023-04-04 ENCOUNTER — TRANSCRIPTION ENCOUNTER (OUTPATIENT)
Age: 68
End: 2023-04-04

## 2023-04-04 LAB
ANION GAP SERPL CALC-SCNC: 12 MMOL/L
APPEARANCE: CLEAR
BACTERIA: NEGATIVE
BILIRUBIN URINE: NEGATIVE
BLOOD URINE: NEGATIVE
BUN SERPL-MCNC: 13 MG/DL
CALCIUM SERPL-MCNC: 9.9 MG/DL
CHLORIDE SERPL-SCNC: 101 MMOL/L
CO2 SERPL-SCNC: 25 MMOL/L
COLOR: YELLOW
CREAT SERPL-MCNC: 0.6 MG/DL
CREAT SPEC-SCNC: 153 MG/DL
EGFR: 97 ML/MIN/1.73M2
GLUCOSE QUALITATIVE U: NEGATIVE
GLUCOSE SERPL-MCNC: 92 MG/DL
HYALINE CASTS: 0 /LPF
KETONES URINE: NEGATIVE
LEUKOCYTE ESTERASE URINE: NEGATIVE
MAGNESIUM SERPL-MCNC: 2 MG/DL
MICROALBUMIN 24H UR DL<=1MG/L-MCNC: <1.2 MG/DL
MICROALBUMIN/CREAT 24H UR-RTO: NORMAL MG/G
MICROSCOPIC-UA: NORMAL
NITRITE URINE: NEGATIVE
PH URINE: 6
POTASSIUM SERPL-SCNC: 4.6 MMOL/L
PROTEIN URINE: NORMAL
RED BLOOD CELLS URINE: 3 /HPF
SODIUM SERPL-SCNC: 139 MMOL/L
SPECIFIC GRAVITY URINE: 1.03
SQUAMOUS EPITHELIAL CELLS: 0 /HPF
UROBILINOGEN URINE: NORMAL
WHITE BLOOD CELLS URINE: 0 /HPF

## 2023-04-04 NOTE — PHYSICAL EXAM
[No Acute Distress] : no acute distress [Well Nourished] : well nourished [Well Developed] : well developed [Well-Appearing] : well-appearing [Normal Sclera/Conjunctiva] : normal sclera/conjunctiva [Normal Outer Ear/Nose] : the outer ears and nose were normal in appearance [Normal Oropharynx] : the oropharynx was normal [No JVD] : no jugular venous distention [No Lymphadenopathy] : no lymphadenopathy [Supple] : supple [Thyroid Normal, No Nodules] : the thyroid was normal and there were no nodules present [No Respiratory Distress] : no respiratory distress  [No Accessory Muscle Use] : no accessory muscle use [Clear to Auscultation] : lungs were clear to auscultation bilaterally [Normal Rate] : normal rate  [Regular Rhythm] : with a regular rhythm [Normal S1, S2] : normal S1 and S2 [No Murmur] : no murmur heard [No Edema] : there was no peripheral edema [No Extremity Clubbing/Cyanosis] : no extremity clubbing/cyanosis [Soft] : abdomen soft [Non Tender] : non-tender [Non-distended] : non-distended [No Masses] : no abdominal mass palpated [No HSM] : no HSM [Normal Bowel Sounds] : normal bowel sounds [Normal Posterior Cervical Nodes] : no posterior cervical lymphadenopathy [Normal Anterior Cervical Nodes] : no anterior cervical lymphadenopathy [No CVA Tenderness] : no CVA  tenderness [No Spinal Tenderness] : no spinal tenderness [Kyphosis] : kyphosis [No Joint Swelling] : no joint swelling [Grossly Normal Strength/Tone] : grossly normal strength/tone [No Rash] : no rash [Coordination Grossly Intact] : coordination grossly intact [No Focal Deficits] : no focal deficits [Normal Gait] : normal gait [Normal Affect] : the affect was normal [Alert and Oriented x3] : oriented to person, place, and time [Normal Insight/Judgement] : insight and judgment were intact

## 2023-04-04 NOTE — HISTORY OF PRESENT ILLNESS
[FreeTextEntry1] : Establish care. Was previously using  practice [de-identified] : fell 12/30/22 (tripped on street, had laceration of R eyebrow that was glued together)\par \par Chronic lower back pain: herniated discs. Was seeing a specialist and was prescribed PT but couldn't start because of the fall\par \par 1999: had hepatitis after taking ibuprofen. Tried celebrex but states she couldn't move after taking that.\par \par Had episode of finger turning blue temporarily for which she saw vascular and cardiology\par \par Labs with Dr. Sun

## 2023-04-04 NOTE — HEALTH RISK ASSESSMENT
[0] : 2) Feeling down, depressed, or hopeless: Not at all (0) [Patient reported mammogram was normal] : Patient reported mammogram was normal [Patient reported PAP Smear was normal] : Patient reported PAP Smear was normal [[id number]] : 0 [MammogramDate] : 07/22 [PapSmearDate] : 07/22 [PapSmearComments] : Dr. Crystal Fierro [BoneDensityDate] : ~2021

## 2023-04-05 ENCOUNTER — TRANSCRIPTION ENCOUNTER (OUTPATIENT)
Age: 68
End: 2023-04-05

## 2023-04-05 PROBLEM — M79.81 ACHENBACH'S SYNDROME: Status: RESOLVED | Noted: 2022-09-07 | Resolved: 2023-03-10

## 2024-06-25 ENCOUNTER — NON-APPOINTMENT (OUTPATIENT)
Age: 69
End: 2024-06-25

## 2024-06-25 ENCOUNTER — APPOINTMENT (OUTPATIENT)
Dept: INTERNAL MEDICINE | Facility: CLINIC | Age: 69
End: 2024-06-25
Payer: MEDICARE

## 2024-06-25 VITALS
DIASTOLIC BLOOD PRESSURE: 87 MMHG | OXYGEN SATURATION: 98 % | HEART RATE: 71 BPM | SYSTOLIC BLOOD PRESSURE: 138 MMHG | BODY MASS INDEX: 23.95 KG/M2 | RESPIRATION RATE: 16 BRPM | HEIGHT: 60 IN | WEIGHT: 122 LBS | TEMPERATURE: 97.7 F

## 2024-06-25 DIAGNOSIS — K63.9 DISEASE OF INTESTINE, UNSPECIFIED: ICD-10-CM

## 2024-06-25 DIAGNOSIS — R22.9 LOCALIZED SWELLING, MASS AND LUMP, UNSPECIFIED: ICD-10-CM

## 2024-06-25 DIAGNOSIS — H92.02 OTALGIA, LEFT EAR: ICD-10-CM

## 2024-06-25 DIAGNOSIS — Z00.00 ENCOUNTER FOR GENERAL ADULT MEDICAL EXAMINATION W/OUT ABNORMAL FINDINGS: ICD-10-CM

## 2024-06-25 DIAGNOSIS — R14.0 ABDOMINAL DISTENSION (GASEOUS): ICD-10-CM

## 2024-06-25 DIAGNOSIS — Z87.828 PERSONAL HISTORY OF OTHER (HEALED) PHYSICAL INJURY AND TRAUMA: ICD-10-CM

## 2024-06-25 DIAGNOSIS — R29.890 LOSS OF HEIGHT: ICD-10-CM

## 2024-06-25 DIAGNOSIS — M51.9 UNSPECIFIED THORACIC, THORACOLUMBAR AND LUMBOSACRAL INTERVERTEBRAL DISC DISORDER: ICD-10-CM

## 2024-06-25 DIAGNOSIS — Z12.11 ENCOUNTER FOR SCREENING FOR MALIGNANT NEOPLASM OF COLON: ICD-10-CM

## 2024-06-25 DIAGNOSIS — M81.0 AGE-RELATED OSTEOPOROSIS W/OUT CURRENT PATHOLOGICAL FRACTURE: ICD-10-CM

## 2024-06-25 DIAGNOSIS — I10 ESSENTIAL (PRIMARY) HYPERTENSION: ICD-10-CM

## 2024-06-25 DIAGNOSIS — E87.5 HYPERKALEMIA: ICD-10-CM

## 2024-06-25 DIAGNOSIS — Z12.39 ENCOUNTER FOR OTHER SCREENING FOR MALIGNANT NEOPLASM OF BREAST: ICD-10-CM

## 2024-06-25 DIAGNOSIS — R06.02 SHORTNESS OF BREATH: ICD-10-CM

## 2024-06-25 PROCEDURE — G0439: CPT

## 2024-06-25 PROCEDURE — 36415 COLL VENOUS BLD VENIPUNCTURE: CPT

## 2024-06-25 PROCEDURE — 99214 OFFICE O/P EST MOD 30 MIN: CPT | Mod: 25

## 2024-06-25 PROCEDURE — 93000 ELECTROCARDIOGRAM COMPLETE: CPT

## 2024-06-26 LAB
25(OH)D3 SERPL-MCNC: 33 NG/ML
ALBUMIN SERPL ELPH-MCNC: 4.7 G/DL
ALP BLD-CCNC: 100 U/L
ALT SERPL-CCNC: 28 U/L
ANION GAP SERPL CALC-SCNC: 12 MMOL/L
APPEARANCE: CLEAR
AST SERPL-CCNC: 27 U/L
BACTERIA: NEGATIVE /HPF
BASOPHILS # BLD AUTO: 0.07 K/UL
BASOPHILS NFR BLD AUTO: 1 %
BILIRUB SERPL-MCNC: 0.5 MG/DL
BILIRUBIN URINE: NEGATIVE
BLOOD URINE: NEGATIVE
BUN SERPL-MCNC: 21 MG/DL
CALCIUM SERPL-MCNC: 10.4 MG/DL
CAST: 0 /LPF
CHLORIDE SERPL-SCNC: 101 MMOL/L
CHOLEST SERPL-MCNC: 249 MG/DL
CO2 SERPL-SCNC: 24 MMOL/L
COLOR: YELLOW
CREAT SERPL-MCNC: 0.62 MG/DL
CREAT SPEC-SCNC: 51 MG/DL
EGFR: 96 ML/MIN/1.73M2
EOSINOPHIL # BLD AUTO: 0.24 K/UL
EOSINOPHIL NFR BLD AUTO: 3.5 %
EPITHELIAL CELLS: 0 /HPF
ESTIMATED AVERAGE GLUCOSE: 108 MG/DL
GLUCOSE QUALITATIVE U: NEGATIVE MG/DL
GLUCOSE SERPL-MCNC: 95 MG/DL
HBA1C MFR BLD HPLC: 5.4 %
HCT VFR BLD CALC: 39 %
HDLC SERPL-MCNC: 106 MG/DL
HGB BLD-MCNC: 13.1 G/DL
IMM GRANULOCYTES NFR BLD AUTO: 0.1 %
KETONES URINE: NEGATIVE MG/DL
LDLC SERPL CALC-MCNC: 132 MG/DL
LEUKOCYTE ESTERASE URINE: NEGATIVE
LYMPHOCYTES # BLD AUTO: 2.63 K/UL
LYMPHOCYTES NFR BLD AUTO: 38.6 %
MAN DIFF?: NORMAL
MCHC RBC-ENTMCNC: 32 PG
MCHC RBC-ENTMCNC: 33.6 GM/DL
MCV RBC AUTO: 95.1 FL
MICROALBUMIN 24H UR DL<=1MG/L-MCNC: <1.2 MG/DL
MICROALBUMIN/CREAT 24H UR-RTO: NORMAL MG/G
MICROSCOPIC-UA: NORMAL
MONOCYTES # BLD AUTO: 0.52 K/UL
MONOCYTES NFR BLD AUTO: 7.6 %
NEUTROPHILS # BLD AUTO: 3.35 K/UL
NEUTROPHILS NFR BLD AUTO: 49.2 %
NITRITE URINE: NEGATIVE
NONHDLC SERPL-MCNC: 143 MG/DL
NT-PROBNP SERPL-MCNC: 149 PG/ML
PH URINE: 5.5
PLATELET # BLD AUTO: 301 K/UL
POTASSIUM SERPL-SCNC: 5.7 MMOL/L
PROT SERPL-MCNC: 7.6 G/DL
PROTEIN URINE: NEGATIVE MG/DL
RBC # BLD: 4.1 M/UL
RBC # FLD: 13.2 %
RED BLOOD CELLS URINE: 0 /HPF
SODIUM SERPL-SCNC: 136 MMOL/L
SPECIFIC GRAVITY URINE: 1.02
TRIGL SERPL-MCNC: 69 MG/DL
TSH SERPL-ACNC: 1.72 UIU/ML
UROBILINOGEN URINE: 0.2 MG/DL
WBC # FLD AUTO: 6.82 K/UL
WHITE BLOOD CELLS URINE: 0 /HPF

## 2024-06-28 LAB — HEMOCCULT STL QL IA: NEGATIVE

## 2024-08-05 PROBLEM — R92.30 DENSE BREAST TISSUE: Status: ACTIVE | Noted: 2024-08-05

## 2024-08-06 ENCOUNTER — TRANSCRIPTION ENCOUNTER (OUTPATIENT)
Age: 69
End: 2024-08-06

## 2024-12-11 ENCOUNTER — NON-APPOINTMENT (OUTPATIENT)
Age: 69
End: 2024-12-11

## 2024-12-11 ENCOUNTER — APPOINTMENT (OUTPATIENT)
Dept: OPHTHALMOLOGY | Facility: CLINIC | Age: 69
End: 2024-12-11
Payer: MEDICARE

## 2024-12-11 PROCEDURE — 92004 COMPRE OPH EXAM NEW PT 1/>: CPT

## 2024-12-11 PROCEDURE — 92025 CPTRIZED CORNEAL TOPOGRAPHY: CPT

## 2024-12-11 PROCEDURE — 92136 OPHTHALMIC BIOMETRY: CPT

## 2024-12-18 ENCOUNTER — TRANSCRIPTION ENCOUNTER (OUTPATIENT)
Age: 69
End: 2024-12-18

## 2024-12-25 PROBLEM — F10.90 ALCOHOL USE: Status: ACTIVE | Noted: 2019-03-18

## 2025-03-05 ENCOUNTER — APPOINTMENT (OUTPATIENT)
Dept: HEART AND VASCULAR | Facility: CLINIC | Age: 70
End: 2025-03-05
Payer: MEDICARE

## 2025-03-05 ENCOUNTER — NON-APPOINTMENT (OUTPATIENT)
Age: 70
End: 2025-03-05

## 2025-03-05 VITALS — DIASTOLIC BLOOD PRESSURE: 79 MMHG | SYSTOLIC BLOOD PRESSURE: 130 MMHG | HEART RATE: 74 BPM

## 2025-03-05 VITALS
DIASTOLIC BLOOD PRESSURE: 85 MMHG | SYSTOLIC BLOOD PRESSURE: 137 MMHG | BODY MASS INDEX: 24.54 KG/M2 | HEART RATE: 78 BPM | OXYGEN SATURATION: 97 % | WEIGHT: 125 LBS | HEIGHT: 60 IN

## 2025-03-05 DIAGNOSIS — Z12.11 ENCOUNTER FOR SCREENING FOR MALIGNANT NEOPLASM OF COLON: ICD-10-CM

## 2025-03-05 DIAGNOSIS — R42 DIZZINESS AND GIDDINESS: ICD-10-CM

## 2025-03-05 DIAGNOSIS — Z12.39 ENCOUNTER FOR OTHER SCREENING FOR MALIGNANT NEOPLASM OF BREAST: ICD-10-CM

## 2025-03-05 DIAGNOSIS — S83.242A OTHER TEAR OF MEDIAL MENISCUS, CURRENT INJURY, LEFT KNEE, INITIAL ENCOUNTER: ICD-10-CM

## 2025-03-05 DIAGNOSIS — R06.02 SHORTNESS OF BREATH: ICD-10-CM

## 2025-03-05 DIAGNOSIS — Z23 ENCOUNTER FOR IMMUNIZATION: ICD-10-CM

## 2025-03-05 PROCEDURE — 93000 ELECTROCARDIOGRAM COMPLETE: CPT

## 2025-03-05 PROCEDURE — G2211 COMPLEX E/M VISIT ADD ON: CPT

## 2025-03-05 PROCEDURE — 99214 OFFICE O/P EST MOD 30 MIN: CPT

## 2025-03-06 LAB
ALBUMIN SERPL ELPH-MCNC: 4.5 G/DL
ALP BLD-CCNC: 106 U/L
ALT SERPL-CCNC: 20 U/L
ANION GAP SERPL CALC-SCNC: 12 MMOL/L
AST SERPL-CCNC: 22 U/L
BASOPHILS # BLD AUTO: 0.05 K/UL
BASOPHILS NFR BLD AUTO: 0.8 %
BILIRUB SERPL-MCNC: 0.5 MG/DL
BUN SERPL-MCNC: 21 MG/DL
CALCIUM SERPL-MCNC: 10 MG/DL
CHLORIDE SERPL-SCNC: 105 MMOL/L
CHOLEST SERPL-MCNC: 254 MG/DL
CO2 SERPL-SCNC: 25 MMOL/L
CREAT SERPL-MCNC: 0.65 MG/DL
EGFRCR SERPLBLD CKD-EPI 2021: 94 ML/MIN/1.73M2
EOSINOPHIL # BLD AUTO: 0.26 K/UL
EOSINOPHIL NFR BLD AUTO: 4.2 %
ESTIMATED AVERAGE GLUCOSE: 111 MG/DL
GLUCOSE SERPL-MCNC: 102 MG/DL
HBA1C MFR BLD HPLC: 5.5 %
HCT VFR BLD CALC: 40.1 %
HDLC SERPL-MCNC: 93 MG/DL
HGB BLD-MCNC: 13 G/DL
IMM GRANULOCYTES NFR BLD AUTO: 0.2 %
LDLC SERPL DIRECT ASSAY-MCNC: 134 MG/DL
LYMPHOCYTES # BLD AUTO: 2.03 K/UL
LYMPHOCYTES NFR BLD AUTO: 33.1 %
MAN DIFF?: NORMAL
MCHC RBC-ENTMCNC: 31.6 PG
MCHC RBC-ENTMCNC: 32.4 G/DL
MCV RBC AUTO: 97.6 FL
MONOCYTES # BLD AUTO: 0.57 K/UL
MONOCYTES NFR BLD AUTO: 9.3 %
NEUTROPHILS # BLD AUTO: 3.22 K/UL
NEUTROPHILS NFR BLD AUTO: 52.4 %
PLATELET # BLD AUTO: 362 K/UL
POTASSIUM SERPL-SCNC: 6.3 MMOL/L
PROT SERPL-MCNC: 7 G/DL
RBC # BLD: 4.11 M/UL
RBC # FLD: 13.2 %
SODIUM SERPL-SCNC: 141 MMOL/L
TRIGL SERPL-MCNC: 60 MG/DL
TSH SERPL-ACNC: 1.89 UIU/ML
WBC # FLD AUTO: 6.14 K/UL

## 2025-03-10 ENCOUNTER — APPOINTMENT (OUTPATIENT)
Dept: INTERNAL MEDICINE | Facility: CLINIC | Age: 70
End: 2025-03-10
Payer: MEDICARE

## 2025-03-10 VITALS
OXYGEN SATURATION: 99 % | WEIGHT: 125 LBS | BODY MASS INDEX: 24.54 KG/M2 | HEIGHT: 60 IN | HEART RATE: 65 BPM | DIASTOLIC BLOOD PRESSURE: 84 MMHG | TEMPERATURE: 97.6 F | SYSTOLIC BLOOD PRESSURE: 149 MMHG

## 2025-03-10 DIAGNOSIS — M51.9 UNSPECIFIED THORACIC, THORACOLUMBAR AND LUMBOSACRAL INTERVERTEBRAL DISC DISORDER: ICD-10-CM

## 2025-03-10 DIAGNOSIS — H25.89 OTHER AGE-RELATED CATARACT: ICD-10-CM

## 2025-03-10 DIAGNOSIS — I10 ESSENTIAL (PRIMARY) HYPERTENSION: ICD-10-CM

## 2025-03-10 DIAGNOSIS — E87.5 HYPERKALEMIA: ICD-10-CM

## 2025-03-10 DIAGNOSIS — M19.071 PRIMARY OSTEOARTHRITIS, RIGHT ANKLE AND FOOT: ICD-10-CM

## 2025-03-10 DIAGNOSIS — H92.02 OTALGIA, LEFT EAR: ICD-10-CM

## 2025-03-10 DIAGNOSIS — H81.20 VESTIBULAR NEURONITIS, UNSPECIFIED EAR: ICD-10-CM

## 2025-03-10 PROCEDURE — 99214 OFFICE O/P EST MOD 30 MIN: CPT

## 2025-03-10 PROCEDURE — 36415 COLL VENOUS BLD VENIPUNCTURE: CPT

## 2025-03-10 PROCEDURE — G2211 COMPLEX E/M VISIT ADD ON: CPT

## 2025-03-10 RX ORDER — MECLIZINE HYDROCHLORIDE 12.5 MG/1
12.5 TABLET ORAL
Qty: 30 | Refills: 1 | Status: ACTIVE | COMMUNITY
Start: 2025-03-10 | End: 1900-01-01

## 2025-03-11 ENCOUNTER — NON-APPOINTMENT (OUTPATIENT)
Age: 70
End: 2025-03-11

## 2025-03-11 ENCOUNTER — APPOINTMENT (OUTPATIENT)
Dept: OPHTHALMOLOGY | Facility: CLINIC | Age: 70
End: 2025-03-11
Payer: MEDICARE

## 2025-03-11 PROCEDURE — 92012 INTRM OPH EXAM EST PATIENT: CPT

## 2025-03-11 PROCEDURE — 92136 OPHTHALMIC BIOMETRY: CPT

## 2025-03-11 PROCEDURE — 92025 CPTRIZED CORNEAL TOPOGRAPHY: CPT

## 2025-03-11 RX ORDER — HYDROCHLOROTHIAZIDE 12.5 MG/1
12.5 TABLET ORAL DAILY
Qty: 30 | Refills: 1 | Status: ACTIVE | COMMUNITY
Start: 2025-03-11 | End: 1900-01-01

## 2025-03-14 LAB
ANION GAP SERPL CALC-SCNC: 8 MMOL/L
APPEARANCE: CLEAR
BACTERIA: NEGATIVE /HPF
BILIRUBIN URINE: NEGATIVE
BLOOD URINE: NEGATIVE
BUN SERPL-MCNC: 18 MG/DL
CALCIUM SERPL-MCNC: 10.2 MG/DL
CAST: 0 /LPF
CHLORIDE SERPL-SCNC: 102 MMOL/L
CO2 SERPL-SCNC: 28 MMOL/L
COLOR: YELLOW
CREAT SERPL-MCNC: 0.61 MG/DL
EGFRCR SERPLBLD CKD-EPI 2021: 96 ML/MIN/1.73M2
EPITHELIAL CELLS: 0 /HPF
GLUCOSE QUALITATIVE U: NEGATIVE MG/DL
GLUCOSE SERPL-MCNC: 106 MG/DL
KETONES URINE: NEGATIVE MG/DL
LEUKOCYTE ESTERASE URINE: NEGATIVE
MICROSCOPIC-UA: NORMAL
NITRITE URINE: NEGATIVE
PH URINE: 6
POTASSIUM SERPL-SCNC: 5.9 MMOL/L
POTASSIUM UR-SCNC: >100 MMOL/L
PROTEIN URINE: NEGATIVE MG/DL
RED BLOOD CELLS URINE: 0 /HPF
SODIUM SERPL-SCNC: 138 MMOL/L
SPECIFIC GRAVITY URINE: 1.02
UROBILINOGEN URINE: 0.2 MG/DL
WHITE BLOOD CELLS URINE: 0 /HPF

## 2025-03-15 LAB
ANION GAP SERPL CALC-SCNC: 11 MMOL/L
BUN SERPL-MCNC: 17 MG/DL
CALCIUM SERPL-MCNC: 9.9 MG/DL
CHLORIDE SERPL-SCNC: 99 MMOL/L
CO2 SERPL-SCNC: 28 MMOL/L
CREAT SERPL-MCNC: 0.66 MG/DL
EGFRCR SERPLBLD CKD-EPI 2021: 94 ML/MIN/1.73M2
GLUCOSE SERPL-MCNC: 100 MG/DL
POTASSIUM SERPL-SCNC: 4.5 MMOL/L
SODIUM SERPL-SCNC: 138 MMOL/L

## 2025-03-17 ENCOUNTER — NON-APPOINTMENT (OUTPATIENT)
Age: 70
End: 2025-03-17

## 2025-03-18 LAB
CREAT 24H UR-MCNC: 1.1 G/24 H
CREAT ?TM UR-MCNC: 125 MG/DL
POTASSIUM 24H UR-MRATE: 63.4 MMOL/24HR
POTASSIUM ?TM SUB UR QN: 72.4 MMOL/L
PROT ?TM UR-MCNC: 24 HR
SPECIMEN VOL 24H UR: 875 ML

## 2025-03-21 RX ORDER — LORAZEPAM 0.5 MG/1
0.5 TABLET ORAL
Qty: 2 | Refills: 0 | Status: ACTIVE | COMMUNITY
Start: 2025-03-21 | End: 1900-01-01

## 2025-03-24 ENCOUNTER — APPOINTMENT (OUTPATIENT)
Dept: OPHTHALMOLOGY | Facility: AMBULATORY SURGERY CENTER | Age: 70
End: 2025-03-24

## 2025-03-25 ENCOUNTER — APPOINTMENT (OUTPATIENT)
Dept: OPHTHALMOLOGY | Facility: CLINIC | Age: 70
End: 2025-03-25

## 2025-03-26 DIAGNOSIS — M47.812 SPONDYLOSIS W/OUT MYELOPATHY OR RADICULOPATHY, CERVICAL REGION: ICD-10-CM

## 2025-04-01 ENCOUNTER — APPOINTMENT (OUTPATIENT)
Dept: ORTHOPEDIC SURGERY | Facility: CLINIC | Age: 70
End: 2025-04-01

## 2025-04-01 VITALS
BODY MASS INDEX: 24.35 KG/M2 | TEMPERATURE: 98.2 F | WEIGHT: 124 LBS | SYSTOLIC BLOOD PRESSURE: 129 MMHG | DIASTOLIC BLOOD PRESSURE: 84 MMHG | HEART RATE: 65 BPM | HEIGHT: 60 IN | OXYGEN SATURATION: 97 %

## 2025-04-01 DIAGNOSIS — E26.9 HYPERALDOSTERONISM, UNSPECIFIED: ICD-10-CM

## 2025-04-01 PROBLEM — M43.12 ACQUIRED SPONDYLOLISTHESIS OF CERVICAL VERTEBRA: Status: ACTIVE | Noted: 2025-04-01

## 2025-04-01 LAB — ALDOSTERONE/RENIN RATIO: 1.3 RATIO

## 2025-04-03 ENCOUNTER — APPOINTMENT (OUTPATIENT)
Dept: OPHTHALMOLOGY | Facility: CLINIC | Age: 70
End: 2025-04-03

## 2025-04-07 ENCOUNTER — APPOINTMENT (OUTPATIENT)
Dept: OPHTHALMOLOGY | Facility: AMBULATORY SURGERY CENTER | Age: 70
End: 2025-04-07

## 2025-04-08 ENCOUNTER — APPOINTMENT (OUTPATIENT)
Dept: ORTHOPEDIC SURGERY | Facility: CLINIC | Age: 70
End: 2025-04-08
Payer: MEDICARE

## 2025-04-08 ENCOUNTER — OUTPATIENT (OUTPATIENT)
Dept: OUTPATIENT SERVICES | Facility: HOSPITAL | Age: 70
LOS: 1 days | End: 2025-04-08
Payer: MEDICARE

## 2025-04-08 ENCOUNTER — APPOINTMENT (OUTPATIENT)
Dept: OPHTHALMOLOGY | Facility: CLINIC | Age: 70
End: 2025-04-08

## 2025-04-08 DIAGNOSIS — M43.12 SPONDYLOLISTHESIS, CERVICAL REGION: ICD-10-CM

## 2025-04-08 DIAGNOSIS — M51.360: ICD-10-CM

## 2025-04-08 PROCEDURE — 99204 OFFICE O/P NEW MOD 45 MIN: CPT

## 2025-04-08 PROCEDURE — 73630 X-RAY EXAM OF FOOT: CPT | Mod: 26,RT

## 2025-04-08 PROCEDURE — 73600 X-RAY EXAM OF ANKLE: CPT | Mod: 26,RT

## 2025-04-08 PROCEDURE — 73630 X-RAY EXAM OF FOOT: CPT

## 2025-04-08 PROCEDURE — 73600 X-RAY EXAM OF ANKLE: CPT

## 2025-04-09 ENCOUNTER — APPOINTMENT (OUTPATIENT)
Dept: ORTHOPEDIC SURGERY | Facility: CLINIC | Age: 70
End: 2025-04-09
Payer: MEDICARE

## 2025-04-09 VITALS — WEIGHT: 125 LBS | RESPIRATION RATE: 19 BRPM | BODY MASS INDEX: 23.6 KG/M2 | HEIGHT: 61 IN

## 2025-04-09 DIAGNOSIS — M19.071 PRIMARY OSTEOARTHRITIS, RIGHT ANKLE AND FOOT: ICD-10-CM

## 2025-04-09 PROCEDURE — 73630 X-RAY EXAM OF FOOT: CPT | Mod: RT

## 2025-04-09 PROCEDURE — 99214 OFFICE O/P EST MOD 30 MIN: CPT

## 2025-04-09 PROCEDURE — 73600 X-RAY EXAM OF ANKLE: CPT | Mod: RT

## 2025-04-09 RX ORDER — DIAZEPAM 5 MG/1
5 TABLET ORAL
Qty: 1 | Refills: 0 | Status: DISCONTINUED | COMMUNITY
Start: 2025-04-02 | End: 2025-04-09

## 2025-04-17 ENCOUNTER — APPOINTMENT (OUTPATIENT)
Dept: OPHTHALMOLOGY | Facility: CLINIC | Age: 70
End: 2025-04-17

## 2025-04-23 ENCOUNTER — OUTPATIENT (OUTPATIENT)
Dept: OUTPATIENT SERVICES | Facility: HOSPITAL | Age: 70
LOS: 1 days | End: 2025-04-23
Payer: MEDICARE

## 2025-04-23 ENCOUNTER — NON-APPOINTMENT (OUTPATIENT)
Age: 70
End: 2025-04-23

## 2025-04-23 ENCOUNTER — RESULT REVIEW (OUTPATIENT)
Age: 70
End: 2025-04-23

## 2025-04-23 DIAGNOSIS — R06.02 SHORTNESS OF BREATH: ICD-10-CM

## 2025-04-23 PROCEDURE — 93351 STRESS TTE COMPLETE: CPT | Mod: 26,52

## 2025-04-23 PROCEDURE — 93351 STRESS TTE COMPLETE: CPT

## 2025-05-13 ENCOUNTER — NON-APPOINTMENT (OUTPATIENT)
Age: 70
End: 2025-05-13

## 2025-05-13 ENCOUNTER — APPOINTMENT (OUTPATIENT)
Dept: NEUROLOGY | Facility: CLINIC | Age: 70
End: 2025-05-13
Payer: MEDICARE

## 2025-05-13 VITALS
DIASTOLIC BLOOD PRESSURE: 82 MMHG | HEART RATE: 70 BPM | BODY MASS INDEX: 23.6 KG/M2 | OXYGEN SATURATION: 97 % | SYSTOLIC BLOOD PRESSURE: 131 MMHG | HEIGHT: 61 IN | WEIGHT: 125 LBS

## 2025-05-13 DIAGNOSIS — H81.90 UNSPECIFIED DISORDER OF VESTIBULAR FUNCTION, UNSPECIFIED EAR: ICD-10-CM

## 2025-05-13 PROCEDURE — 99204 OFFICE O/P NEW MOD 45 MIN: CPT

## 2025-05-20 ENCOUNTER — RESULT REVIEW (OUTPATIENT)
Age: 70
End: 2025-05-20

## 2025-05-20 DIAGNOSIS — M25.561 PAIN IN RIGHT KNEE: ICD-10-CM

## 2025-05-21 ENCOUNTER — APPOINTMENT (OUTPATIENT)
Dept: RADIOLOGY | Facility: CLINIC | Age: 70
End: 2025-05-21
Payer: MEDICARE

## 2025-05-21 PROCEDURE — 73564 X-RAY EXAM KNEE 4 OR MORE: CPT | Mod: 50

## 2025-05-23 DIAGNOSIS — S83.241A OTHER TEAR OF MEDIAL MENISCUS, CURRENT INJURY, RIGHT KNEE, INITIAL ENCOUNTER: ICD-10-CM

## 2025-05-23 RX ORDER — LIDOCAINE 5% 700 MG/1
5 PATCH TOPICAL
Qty: 1 | Refills: 2 | Status: ACTIVE | COMMUNITY
Start: 2025-05-23 | End: 1900-01-01

## 2025-05-23 RX ORDER — LIDOCAINE 5 G/100G
5 OINTMENT TOPICAL
Qty: 1 | Refills: 2 | Status: ACTIVE | COMMUNITY
Start: 2025-05-23 | End: 1900-01-01

## 2025-05-25 ENCOUNTER — EMERGENCY (EMERGENCY)
Facility: HOSPITAL | Age: 70
LOS: 1 days | End: 2025-05-25
Attending: EMERGENCY MEDICINE | Admitting: EMERGENCY MEDICINE
Payer: MEDICARE

## 2025-05-25 VITALS
SYSTOLIC BLOOD PRESSURE: 122 MMHG | DIASTOLIC BLOOD PRESSURE: 88 MMHG | HEART RATE: 82 BPM | TEMPERATURE: 99 F | OXYGEN SATURATION: 100 % | RESPIRATION RATE: 18 BRPM

## 2025-05-25 VITALS
RESPIRATION RATE: 16 BRPM | SYSTOLIC BLOOD PRESSURE: 137 MMHG | TEMPERATURE: 99 F | HEART RATE: 72 BPM | WEIGHT: 175.05 LBS | DIASTOLIC BLOOD PRESSURE: 87 MMHG | OXYGEN SATURATION: 100 %

## 2025-05-25 PROCEDURE — 73564 X-RAY EXAM KNEE 4 OR MORE: CPT | Mod: 26,RT

## 2025-05-25 PROCEDURE — 99283 EMERGENCY DEPT VISIT LOW MDM: CPT | Mod: 25

## 2025-05-25 PROCEDURE — 99284 EMERGENCY DEPT VISIT MOD MDM: CPT

## 2025-05-25 PROCEDURE — 73564 X-RAY EXAM KNEE 4 OR MORE: CPT

## 2025-05-25 NOTE — ED PROVIDER NOTE - CARE PROVIDER_API CALL
Sánchez Orourke  Orthopaedic Surgery  [address]  [place] [address]  Phone: [phone]  Fax: [phone]  Follow Up Time:

## 2025-05-25 NOTE — ED PROVIDER NOTE - PHYSICAL EXAMINATION
no LE edema, normal equal distal pulses. no joint warmth/erythema. Mildly decreased R knee flexion 2/2 pain. Full passive ROM. FROM all other joints. normal valgus/varus stress, negative anterior/posterior drawer test. no bony ttp. strength 5/5.   Ankle/hip flexion/extension 5/5.  No crepitus, firmness, induration, fluctuance. Skin is normal temp. No erythema/warmth. No obvious skin breaks.     Physical Exam:    CONSTITUTIONAL:  Generally well appearing, no acute distress, alert, awake and oriented  HEENT:  Moist mucous membranes, normal voice  PULM:  No accessory muscle use, speaking full sentences  SKIN:  Normal in appearance, normal color no LE edema, normal equal distal pulses. no joint warmth/erythema. Mildly decreased R knee flexion 2/2 pain. Full passive ROM. FROM all other joints. normal valgus/varus stress, negative anterior/posterior drawer test. no bony ttp. strength 5/5.   Ankle/hip flexion/extension 5/5.  No crepitus, firmness, induration, fluctuance. Skin is normal temp. No erythema/warmth. No obvious skin breaks.   Steady gait in ED w/ cane.     Physical Exam:    CONSTITUTIONAL:  Generally well appearing, no acute distress, alert, awake and oriented  HEENT:  Moist mucous membranes, normal voice  PULM:  No accessory muscle use, speaking full sentences  SKIN:  Normal in appearance, normal color

## 2025-05-25 NOTE — ED PROVIDER NOTE - NSFOLLOWUPINSTRUCTIONS_ED_ALL_ED_FT
Can take tylenol 650mg every 6hrs as needed for pain.    Follow up with primary doctor within 1-2 days.    Follow up with orthopedist as soon as possible. Can call [phone] to schedule appointment.     Return for fevers, persistent vomit, uncontrolled pain, focal weakness/numbness, worsening swelling, spreading redness.    Joint Pain    Joint pain (arthralgia) may be caused by many things. Joint pain is likely to go away when you follow instructions from your health care provider for relieving pain at home. However, joint pain can also be caused by conditions that require more treatment. Common causes of joint pain include:  Bruising in the area of the joint.  Injury caused by repeating certain movements too many times (overuse injury).  Age-related joint wear and tear (osteoarthritis).  Buildup of uric acid crystals in the joint (gout).  Inflammation of the joint (rheumatic disease).  Various other forms of arthritis.  Infections of the joint (septic arthritis) or of the bone (osteomyelitis).    Your health care provider may recommend that you take pain medicine or wear a supportive device like an elastic bandage, sling, or splint. If your joint pain continues, you may need lab or imaging tests to diagnose the cause of your joint pain.    Follow these instructions at home:  Managing pain, stiffness, and swelling   If directed, put ice on the painful area. Icing can help to relieve joint pain and swelling.  Put ice in a plastic bag.  Place a towel between your skin and the bag.  Leave the ice on for 20 minutes, 2–3 times a day.  If directed, apply heat to the painful area as often as told by your health care provider. Heat can reduce the stiffness of your muscles and joints. Use the heat source that your health care provider recommends, such as a moist heat pack or a heating pad.  Place a towel between your skin and the heat source.  Leave the heat on for 20–30 minutes.  Remove the heat if your skin turns bright red. This is especially important if you are unable to feel pain, heat, or cold. You may have a greater risk of getting burned.  Move your fingers or toes below the painful joint often. You can avoid stiffness and lessen swelling by doing this.  If possible, raise (elevate) the painful joint above the level of your heart while you are sitting or lying down. To do this, try putting a few pillows under the painful joint.    Activity   Rest the painful joint for as long as directed. Do not do anything that causes or worsens pain.  Begin exercising or stretching the affected area, as told by your health care provider. Ask your health care provider what types of exercise are safe for you.    If you have an elastic bandage, sling, or splint:   Wear the supportive device as told by your health care provider. Remove it only as told by your health care provider.  Loosen the device if your fingers or toes below the joint tingle, become numb, or turn cold and blue.  Keep the device clean.   Ask your health care provider if you should remove the device before bathing. You may need to cover it with a watertight covering when you take a bath or a shower.    General instructions   Take over-the-counter and prescription medicines only as told by your health care provider.  Do not use any products that contain nicotine or tobacco, such as cigarettes and e-cigarettes. If you need help quitting, ask your health care provider.  Keep all follow-up visits as told by your health care provider. This is important.  Contact a health care provider if:  You have pain that gets worse and does not get better with medicine.  Your joint pain does not improve within 3 days.  You have increased bruising or swelling.  You have a fever.  You lose 10 lb (4.5 kg) or more without trying.    Get help right away if:  You cannot move the joint.  Your fingers or toes tingle, become numb, or turn cold and blue.  You have a fever along with a joint that is red, warm, and swollen.    Summary  Joint pain (arthralgia) may be caused by many things.  Your health care provider may recommend that you take pain medicine or wear a supportive device like an elastic bandage, sling, or splint.  If your joint pain continues, you may need tests to diagnose the cause of your joint pain.  Take over-the-counter and prescription medicines only as told by your health care provider.

## 2025-05-25 NOTE — ED ADULT TRIAGE NOTE - CHIEF COMPLAINT QUOTE
pt BIBA from home for eval right knee pain felt a "pop" around 330pm today took 2 extra strength Tylenol without relief. Pt denies numbness tingling but has not been able to bare weight on leg since. Reports recent meniscus injury 2 weeks ago

## 2025-05-25 NOTE — ED ADULT NURSE NOTE - OBJECTIVE STATEMENT
Pt is a 70 YOF who presents with R knee pain 10/10. Pt reports that she had slowly worsening posterior R knee pain for 1-2 weeks. Had gone to UC last week and reportedly had negative XR and US. Was referred for MRI by PMD and had it done 3d ago which reportedly showed partial meniscal tear. Has not followed up w/ ortho yet. Was otherwise feeling like usual self. Was stepping out of the shower, pt unsure exact mechanism, but suddenly felt pop in R knee and felt much worse pain to posterior knee. took tylenol w/o relief. No other systemic symptoms.

## 2025-05-25 NOTE — ED PROVIDER NOTE - CLINICAL SUMMARY MEDICAL DECISION MAKING FREE TEXT BOX
70F denies PMH p/w R knee pain. Pt states that she had slowly worsening posterior R knee pain for 1-2 weeks. Had gone to UC last week and reportedly had negative XR and US. Was referred for MRI by PMD and had it done 3d ago, it reportedly showed partial meniscal tear. Has not followed up w/ ortho yet. Was otherwise feeling like usual self. Was stepping out of the shower, pt unsure exact mechanism, but suddenly felt pop in L knee and felt much worse pain to posterior knee. took tylenol w/o relief. No other systemic symptoms.  Vitals wnl, exam as above.   ddx: Possible further meniscal or ligament/tendon injury. Low suspicion fx.   XR, likely outpt f/u.   Pt does not want narcotics. Has ibuprofen allergy.

## 2025-05-25 NOTE — ED PROVIDER NOTE - PATIENT PORTAL LINK FT
You can access the FollowMyHealth Patient Portal offered by [facility] by registering at the following website: [email or web address]. By joining [facility]’s FollowMyHealth portal, you will also be able to view your health information using other applications (apps) compatible with our system.

## 2025-05-25 NOTE — ED PROVIDER NOTE - OBJECTIVE STATEMENT
70F denies PMH p/w R knee pain. Pt states that she had slowly worsening posterior R knee pain for 1-2 weeks. Had gone to UC last week and reportedly had negative XR and US. Was referred for MRI by PMD and had it done 3d ago, it reportedly showed partial meniscal tear. Has not followed up w/ ortho yet. Was otherwise feeling like usual self. Was stepping out of the shower, pt unsure exact mechanism, but suddenly felt pop in L knee and felt much worse pain to posterior knee. took tylenol w/o relief. No other systemic symptoms.  Denies HA, f/c, SOB/CP, NVD, focal weakness/numbness, other injuries.

## 2025-05-27 ENCOUNTER — APPOINTMENT (OUTPATIENT)
Dept: ORTHOPEDIC SURGERY | Facility: CLINIC | Age: 70
End: 2025-05-27
Payer: MEDICARE

## 2025-05-27 VITALS — BODY MASS INDEX: 23.6 KG/M2 | WEIGHT: 125 LBS | HEIGHT: 61 IN

## 2025-05-27 DIAGNOSIS — S83.241A OTHER TEAR OF MEDIAL MENISCUS, CURRENT INJURY, RIGHT KNEE, INITIAL ENCOUNTER: ICD-10-CM

## 2025-05-27 DIAGNOSIS — M25.561 PAIN IN RIGHT KNEE: ICD-10-CM

## 2025-05-27 DIAGNOSIS — Z88.6 ALLERGY STATUS TO ANALGESIC AGENT: ICD-10-CM

## 2025-05-27 PROCEDURE — 99214 OFFICE O/P EST MOD 30 MIN: CPT | Mod: 25

## 2025-05-27 PROCEDURE — 73560 X-RAY EXAM OF KNEE 1 OR 2: CPT | Mod: 50

## 2025-05-27 PROCEDURE — 20610 DRAIN/INJ JOINT/BURSA W/O US: CPT | Mod: RT

## 2025-06-10 ENCOUNTER — APPOINTMENT (OUTPATIENT)
Dept: ORTHOPEDIC SURGERY | Facility: CLINIC | Age: 70
End: 2025-06-10
Payer: MEDICARE

## 2025-06-10 VITALS
WEIGHT: 125 LBS | OXYGEN SATURATION: 96 % | DIASTOLIC BLOOD PRESSURE: 80 MMHG | SYSTOLIC BLOOD PRESSURE: 151 MMHG | HEIGHT: 61 IN | HEART RATE: 67 BPM | BODY MASS INDEX: 23.6 KG/M2

## 2025-06-10 PROBLEM — Z78.9 NO PERTINENT PAST SURGICAL HISTORY: Status: RESOLVED | Noted: 2019-03-18 | Resolved: 2025-06-10

## 2025-06-10 PROBLEM — Z78.9 NO PERTINENT PAST MEDICAL HISTORY: Status: RESOLVED | Noted: 2019-03-18 | Resolved: 2025-06-10

## 2025-06-10 PROCEDURE — 99213 OFFICE O/P EST LOW 20 MIN: CPT

## 2025-06-23 ENCOUNTER — APPOINTMENT (OUTPATIENT)
Dept: NEPHROLOGY | Facility: CLINIC | Age: 70
End: 2025-06-23
Payer: MEDICARE

## 2025-06-23 ENCOUNTER — OUTPATIENT (OUTPATIENT)
Dept: OUTPATIENT SERVICES | Facility: HOSPITAL | Age: 70
LOS: 1 days | End: 2025-06-23
Payer: MEDICARE

## 2025-06-23 VITALS — DIASTOLIC BLOOD PRESSURE: 80 MMHG | SYSTOLIC BLOOD PRESSURE: 150 MMHG | HEART RATE: 72 BPM

## 2025-06-23 PROBLEM — R06.00 DYSPNEA, UNSPECIFIED: Status: ACTIVE | Noted: 2025-06-23

## 2025-06-23 PROCEDURE — 71046 X-RAY EXAM CHEST 2 VIEWS: CPT

## 2025-06-23 PROCEDURE — 71046 X-RAY EXAM CHEST 2 VIEWS: CPT | Mod: 26

## 2025-06-23 PROCEDURE — G2211 COMPLEX E/M VISIT ADD ON: CPT

## 2025-06-23 PROCEDURE — 99204 OFFICE O/P NEW MOD 45 MIN: CPT

## 2025-06-24 ENCOUNTER — APPOINTMENT (OUTPATIENT)
Dept: ORTHOPEDIC SURGERY | Facility: CLINIC | Age: 70
End: 2025-06-24

## 2025-06-24 VITALS
OXYGEN SATURATION: 100 % | HEIGHT: 61 IN | SYSTOLIC BLOOD PRESSURE: 165 MMHG | BODY MASS INDEX: 23.6 KG/M2 | DIASTOLIC BLOOD PRESSURE: 91 MMHG | HEART RATE: 67 BPM | WEIGHT: 125 LBS

## 2025-06-24 PROCEDURE — 99213 OFFICE O/P EST LOW 20 MIN: CPT

## 2025-06-25 ENCOUNTER — APPOINTMENT (OUTPATIENT)
Dept: ORTHOPEDIC SURGERY | Facility: CLINIC | Age: 70
End: 2025-06-25

## 2025-07-01 ENCOUNTER — APPOINTMENT (OUTPATIENT)
Dept: ORTHOPEDIC SURGERY | Facility: CLINIC | Age: 70
End: 2025-07-01
Payer: MEDICARE

## 2025-07-01 VITALS
DIASTOLIC BLOOD PRESSURE: 79 MMHG | HEART RATE: 75 BPM | BODY MASS INDEX: 23.6 KG/M2 | WEIGHT: 125 LBS | SYSTOLIC BLOOD PRESSURE: 158 MMHG | HEIGHT: 61 IN | OXYGEN SATURATION: 99 %

## 2025-07-01 LAB
ALBUMIN SERPL ELPH-MCNC: 4.4 G/DL
ANION GAP SERPL CALC-SCNC: 12 MMOL/L
APPEARANCE: CLEAR
BACTERIA: NEGATIVE /HPF
BILIRUBIN URINE: NEGATIVE
BLOOD URINE: NEGATIVE
BUN SERPL-MCNC: 21 MG/DL
CALCIUM SERPL-MCNC: 10 MG/DL
CAST: 0 /LPF
CHLORIDE SERPL-SCNC: 102 MMOL/L
CO2 SERPL-SCNC: 24 MMOL/L
COLOR: YELLOW
CREAT SERPL-MCNC: 0.62 MG/DL
EGFRCR SERPLBLD CKD-EPI 2021: 95 ML/MIN/1.73M2
EPITHELIAL CELLS: 0 /HPF
GLUCOSE QUALITATIVE U: NEGATIVE MG/DL
GLUCOSE SERPL-MCNC: 110 MG/DL
HCT VFR BLD CALC: 41.5 %
HGB BLD-MCNC: 13.5 G/DL
KETONES URINE: NEGATIVE MG/DL
LEUKOCYTE ESTERASE URINE: NEGATIVE
MCHC RBC-ENTMCNC: 31 PG
MCHC RBC-ENTMCNC: 32.5 G/DL
MCV RBC AUTO: 95.2 FL
MICROSCOPIC-UA: NORMAL
NITRITE URINE: NEGATIVE
PH URINE: 6
PHOSPHATE SERPL-MCNC: 3.6 MG/DL
PLATELET # BLD AUTO: 329 K/UL
POTASSIUM SERPL-SCNC: 4.7 MMOL/L
POTASSIUM SERPL-SCNC: 5.3 MMOL/L
PROTEIN URINE: NEGATIVE MG/DL
RBC # BLD: 4.36 M/UL
RBC # FLD: 13.6 %
RED BLOOD CELLS URINE: 0 /HPF
SODIUM ?TM SUB UR QN: 90 MMOL/L
SODIUM SERPL-SCNC: 138 MMOL/L
SPECIFIC GRAVITY URINE: 1.01
UROBILINOGEN URINE: 0.2 MG/DL
WBC # FLD AUTO: 7.77 K/UL
WHITE BLOOD CELLS URINE: 0 /HPF

## 2025-07-01 PROCEDURE — 99213 OFFICE O/P EST LOW 20 MIN: CPT

## 2025-07-16 ENCOUNTER — APPOINTMENT (OUTPATIENT)
Dept: HEART AND VASCULAR | Facility: CLINIC | Age: 70
End: 2025-07-16
Payer: MEDICARE

## 2025-07-16 VITALS
OXYGEN SATURATION: 97 % | SYSTOLIC BLOOD PRESSURE: 147 MMHG | DIASTOLIC BLOOD PRESSURE: 76 MMHG | WEIGHT: 127.03 LBS | HEIGHT: 61 IN | BODY MASS INDEX: 23.98 KG/M2 | HEART RATE: 72 BPM

## 2025-07-16 VITALS — DIASTOLIC BLOOD PRESSURE: 79 MMHG | SYSTOLIC BLOOD PRESSURE: 142 MMHG

## 2025-07-16 PROCEDURE — G2211 COMPLEX E/M VISIT ADD ON: CPT

## 2025-07-16 PROCEDURE — 99214 OFFICE O/P EST MOD 30 MIN: CPT

## 2025-07-29 ENCOUNTER — APPOINTMENT (OUTPATIENT)
Dept: ORTHOPEDIC SURGERY | Facility: CLINIC | Age: 70
End: 2025-07-29
Payer: MEDICARE

## 2025-07-29 VITALS
OXYGEN SATURATION: 97 % | BODY MASS INDEX: 23.98 KG/M2 | TEMPERATURE: 98 F | DIASTOLIC BLOOD PRESSURE: 80 MMHG | WEIGHT: 127 LBS | HEIGHT: 61 IN | SYSTOLIC BLOOD PRESSURE: 148 MMHG

## 2025-07-29 DIAGNOSIS — S83.241A OTHER TEAR OF MEDIAL MENISCUS, CURRENT INJURY, RIGHT KNEE, INITIAL ENCOUNTER: ICD-10-CM

## 2025-07-29 PROCEDURE — 99213 OFFICE O/P EST LOW 20 MIN: CPT

## 2025-08-04 ENCOUNTER — APPOINTMENT (OUTPATIENT)
Dept: NEUROLOGY | Facility: CLINIC | Age: 70
End: 2025-08-04

## 2025-08-06 ENCOUNTER — APPOINTMENT (OUTPATIENT)
Dept: NEPHROLOGY | Facility: CLINIC | Age: 70
End: 2025-08-06
Payer: MEDICARE

## 2025-08-06 VITALS — DIASTOLIC BLOOD PRESSURE: 80 MMHG | SYSTOLIC BLOOD PRESSURE: 160 MMHG | HEART RATE: 72 BPM

## 2025-08-06 DIAGNOSIS — R03.0 ELEVATED BLOOD-PRESSURE READING, W/OUT DIAGNOSIS OF HYPERTENSION: ICD-10-CM

## 2025-08-06 PROCEDURE — 93784 AMBL BP MNTR W/SOFTWARE: CPT

## 2025-08-07 ENCOUNTER — APPOINTMENT (OUTPATIENT)
Dept: ORTHOPEDIC SURGERY | Facility: CLINIC | Age: 70
End: 2025-08-07
Payer: MEDICARE

## 2025-08-07 VITALS
WEIGHT: 127 LBS | HEART RATE: 71 BPM | BODY MASS INDEX: 23.98 KG/M2 | SYSTOLIC BLOOD PRESSURE: 158 MMHG | DIASTOLIC BLOOD PRESSURE: 76 MMHG | TEMPERATURE: 98 F | HEIGHT: 61 IN | OXYGEN SATURATION: 94 %

## 2025-08-07 DIAGNOSIS — S83.241A OTHER TEAR OF MEDIAL MENISCUS, CURRENT INJURY, RIGHT KNEE, INITIAL ENCOUNTER: ICD-10-CM

## 2025-08-07 PROCEDURE — 99213 OFFICE O/P EST LOW 20 MIN: CPT

## 2025-08-14 ENCOUNTER — APPOINTMENT (OUTPATIENT)
Dept: NEPHROLOGY | Facility: CLINIC | Age: 70
End: 2025-08-14
Payer: MEDICARE

## 2025-08-14 DIAGNOSIS — F41.9 ANXIETY DISORDER, UNSPECIFIED: ICD-10-CM

## 2025-08-14 DIAGNOSIS — I10 ESSENTIAL (PRIMARY) HYPERTENSION: ICD-10-CM

## 2025-08-14 DIAGNOSIS — E87.5 HYPERKALEMIA: ICD-10-CM

## 2025-08-14 PROCEDURE — 99213 OFFICE O/P EST LOW 20 MIN: CPT | Mod: 93

## 2025-09-04 ENCOUNTER — APPOINTMENT (OUTPATIENT)
Dept: ORTHOPEDIC SURGERY | Facility: CLINIC | Age: 70
End: 2025-09-04
Payer: MEDICARE

## 2025-09-04 VITALS
OXYGEN SATURATION: 97 % | BODY MASS INDEX: 23.98 KG/M2 | DIASTOLIC BLOOD PRESSURE: 84 MMHG | HEART RATE: 72 BPM | HEIGHT: 61 IN | TEMPERATURE: 97.9 F | WEIGHT: 127 LBS | SYSTOLIC BLOOD PRESSURE: 153 MMHG

## 2025-09-04 DIAGNOSIS — S83.241A OTHER TEAR OF MEDIAL MENISCUS, CURRENT INJURY, RIGHT KNEE, INITIAL ENCOUNTER: ICD-10-CM

## 2025-09-04 PROCEDURE — 99213 OFFICE O/P EST LOW 20 MIN: CPT
